# Patient Record
Sex: FEMALE | Race: BLACK OR AFRICAN AMERICAN | NOT HISPANIC OR LATINO | Employment: FULL TIME | URBAN - METROPOLITAN AREA
[De-identification: names, ages, dates, MRNs, and addresses within clinical notes are randomized per-mention and may not be internally consistent; named-entity substitution may affect disease eponyms.]

---

## 2018-07-20 ENCOUNTER — OFFICE VISIT (OUTPATIENT)
Dept: SPORTS MEDICINE | Facility: CLINIC | Age: 43
End: 2018-07-20
Payer: COMMERCIAL

## 2018-07-20 ENCOUNTER — HOSPITAL ENCOUNTER (OUTPATIENT)
Dept: RADIOLOGY | Facility: HOSPITAL | Age: 43
Discharge: HOME | End: 2018-07-20
Attending: PHYSICIAN ASSISTANT
Payer: COMMERCIAL

## 2018-07-20 VITALS
HEART RATE: 94 BPM | BODY MASS INDEX: 43.4 KG/M2 | WEIGHT: 293 LBS | DIASTOLIC BLOOD PRESSURE: 91 MMHG | SYSTOLIC BLOOD PRESSURE: 154 MMHG | HEIGHT: 69 IN

## 2018-07-20 DIAGNOSIS — M22.2X1 PATELLOFEMORAL PAIN SYNDROME OF RIGHT KNEE: Primary | ICD-10-CM

## 2018-07-20 DIAGNOSIS — M25.561 RIGHT KNEE PAIN, UNSPECIFIED CHRONICITY: ICD-10-CM

## 2018-07-20 PROCEDURE — 73564 X-RAY EXAM KNEE 4 OR MORE: CPT | Mod: 26,50,, | Performed by: RADIOLOGY

## 2018-07-20 PROCEDURE — 99999 PR PBB SHADOW E&M-EST. PATIENT-LVL III: CPT | Mod: PBBFAC,,, | Performed by: PHYSICIAN ASSISTANT

## 2018-07-20 PROCEDURE — 99204 OFFICE O/P NEW MOD 45 MIN: CPT | Mod: S$GLB,,, | Performed by: PHYSICIAN ASSISTANT

## 2018-07-20 PROCEDURE — 3008F BODY MASS INDEX DOCD: CPT | Mod: CPTII,S$GLB,, | Performed by: PHYSICIAN ASSISTANT

## 2018-07-20 PROCEDURE — 73564 X-RAY EXAM KNEE 4 OR MORE: CPT | Mod: TC,50,FY,PO

## 2018-07-20 RX ORDER — MELOXICAM 15 MG/1
15 TABLET ORAL DAILY
Qty: 30 TABLET | Refills: 2 | Status: SHIPPED | OUTPATIENT
Start: 2018-07-20 | End: 2018-07-20 | Stop reason: CLARIF

## 2018-07-20 RX ORDER — MELOXICAM 15 MG/1
15 TABLET ORAL DAILY
Qty: 30 TABLET | Refills: 2 | Status: SHIPPED | OUTPATIENT
Start: 2018-07-20 | End: 2019-06-20

## 2018-07-20 NOTE — PROGRESS NOTES
Subjective:          Chief Complaint: Belinda Schultz is a 43 y.o. female who had concerns including Pain of the Right Knee.    Belinda Schultz is a .The pain started 2 weeks ago with no clear JENNY and is becoming progressively worse. Pain is located over (points to) medial joint line. She reports that the pain is a 3 /10 aching and dull pain today and not responding adequately to conservative measures which have included activity modifications, rest, and oral medication (naproxen). Is affecting ADLs and limiting desired level of activity. Denies numbness, tingling, radiation, and inability to bear weight.  Pain is 7 /10 at its worst    Mechanical symptoms: none  Subjective instability: (--)   Worse with flexion  Better with rest.   Nocturnal symptoms: (+)    No previous surgeries or trauma on knee              Review of Systems   Constitution: Negative for chills and fever.   HENT: Negative for congestion and sore throat.    Eyes: Negative for discharge and double vision.   Cardiovascular: Negative for chest pain, palpitations and syncope.   Respiratory: Negative for cough and shortness of breath.    Endocrine: Negative for cold intolerance and heat intolerance.   Skin: Negative for dry skin and rash.   Musculoskeletal: Positive for joint pain and stiffness. Negative for falls, joint swelling, muscle cramps, muscle weakness, myalgias and neck pain.   Gastrointestinal: Negative for abdominal pain, nausea and vomiting.   Neurological: Negative for focal weakness, numbness and paresthesias.       Pain Related Questions  Over the past 3 days, what was your average pain during activity? (I.e. running, jogging, walking, climbing stairs, getting dressed, ect.): 4  Over the past 3 days, what was your highest pain level?: 7  Over the past 3 days, what was your lowest pain level? : 0    Other  Was the patient's HEIGHT measured or patient reported?: Patient Reported  Was the patient's WEIGHT measured or patient  reported?: Measured      Objective:        General: Belinda is well-developed, well-nourished, appears stated age, in no acute distress, alert and oriented to time, place and person.     General    Vitals reviewed.  Constitutional: She is oriented to person, place, and time. She appears well-developed and well-nourished. No distress.   Eyes: Conjunctivae and EOM are normal. Pupils are equal, round, and reactive to light.   Neck: Normal range of motion. Neck supple. No JVD present.   Cardiovascular: Normal heart sounds and intact distal pulses.    No murmur heard.  Pulmonary/Chest: Effort normal and breath sounds normal. No respiratory distress.   Abdominal: Soft. Bowel sounds are normal. She exhibits no distension. There is no tenderness.   Neurological: She is alert and oriented to person, place, and time. Coordination normal.   Psychiatric: She has a normal mood and affect. Her behavior is normal. Judgment and thought content normal.     General Musculoskeletal Exam   Gait: antalgic       Right Knee Exam     Inspection   Erythema: absent  Scars: absent  Swelling: absent  Effusion: effusion  Deformity: deformity  Bruising: absent    Tenderness   The patient is tender to palpation of the medial joint line, condyle and patella.    Crepitus   The patient has crepitus of the patella.    Range of Motion   Extension:  0 (pain) abnormal   Flexion:  110 (pain) abnormal     Tests   Meniscus   Christine:  Medial - positive Lateral - negative  Ligament Examination Lachman: normal (-1 to 2mm) PCL-Posterior Drawer: normal (0 to 2mm)     MCL - Valgus: normal (0 to 2mm)  LCL - Varus: normalDial Test at 90 degrees: normal (< 5 degrees)  Patella   Patellar Tracking: abnormal  Patellar Glide (quadrants): Lateral - 1   Medial - 2  Patellar Grind: positive    Other   Muscle Tightness: quadriceps tightness  Sensation: normal    Comments:  +TTP at medial joint line with terminal flexion and terminal extension      Left Knee Exam      Inspection   Erythema: absent  Scars: absent  Swelling: absent  Effusion: absent  Deformity: deformity  Bruising: absent    Range of Motion   Extension:  0 normal   Flexion:  110 normal     Tests   Meniscus   Christine:  Medial - negative Lateral - negative  Stability Lachman: normal (-1 to 2mm) PCL-Posterior Drawer: normal (0 to 2mm)  MCL - Valgus: normal (0 to 2mm)  LCL - Varus: normal (0 to 2mm)Dial Test at 90 degrees: normal (< 5 degrees)  Patella   Patellar Tracking: abnormal  Patellar Glide (Quadrants): Lateral - 1 Medial - 2  Patellar Grind: negative    Other   Muscle Tightness: quadriceps tightness  Sensation: normal    Right Hip Exam     Tests   Nazia: negative  Left Hip Exam     Tests   Nazia: negative          Muscle Strength   Right Lower Extremity   Hip Abduction: 4/5   Quadriceps:  4/5   Hamstrin/5   Left Lower Extremity   Hip Abduction: 5/5   Quadriceps:  5/5   Hamstrin/5     Vascular Exam     Right Pulses  Dorsalis Pedis:      2+  Posterior Tibial:      2+        Left Pulses  Dorsalis Pedis:      2+  Posterior Tibial:      2+        Edema  Right Lower Leg: absent  Left Lower Leg: absent    Radiographic Findings 2018:    There is a small amount of fluid in the right suprapatellar bursa and a small spur at the superior pole of the right patella.    On AP standing view there is slight loss of joint space in the medial tibial femoral compartments.  Otherwise joint spaces and cortical margins are smooth.    I detect no fracture, dislocation, radiopaque retained foreign body, lytic or blastic lesion, erosion or chondrocalcinosis.    Xrays of the knees were ordered and reviewed by me today. These findings were discussed and reviewed with the patient.            Assessment:       Encounter Diagnoses   Name Primary?    Patellofemoral pain syndrome of right knee Yes    Right knee pain, unspecified chronicity           Plan:       1. Mobic 15 mg 1 time daily PRN for pain management. Patient  understands to take with food and/or OTC prilosec to decrease GI side effects.  2. Ambulatory referral to physical therapy for patellofemoral strengthening and conditioning.  3. Ice compress to the affected area 2-3x a day for 15-20 minutes as needed for pain management.  4. RTC to see Cheikh Pleitez PA-C in 6 weeks for follow-up.      All of the patient's questions were answered and the patient will contact us if they have any questions or concerns in the interim.                        Patient questionnaires may have been collected.

## 2018-09-12 ENCOUNTER — OFFICE VISIT (OUTPATIENT)
Dept: SPORTS MEDICINE | Facility: CLINIC | Age: 43
End: 2018-09-12
Payer: COMMERCIAL

## 2018-09-12 VITALS
HEART RATE: 76 BPM | BODY MASS INDEX: 43.4 KG/M2 | SYSTOLIC BLOOD PRESSURE: 156 MMHG | DIASTOLIC BLOOD PRESSURE: 77 MMHG | WEIGHT: 293 LBS | HEIGHT: 69 IN

## 2018-09-12 DIAGNOSIS — M25.561 RIGHT KNEE PAIN, UNSPECIFIED CHRONICITY: ICD-10-CM

## 2018-09-12 DIAGNOSIS — M22.2X1 PATELLOFEMORAL PAIN SYNDROME OF RIGHT KNEE: Primary | ICD-10-CM

## 2018-09-12 DIAGNOSIS — M17.11 PRIMARY OSTEOARTHRITIS OF RIGHT KNEE: ICD-10-CM

## 2018-09-12 PROCEDURE — 99214 OFFICE O/P EST MOD 30 MIN: CPT | Mod: S$GLB,,, | Performed by: PHYSICIAN ASSISTANT

## 2018-09-12 PROCEDURE — 99999 PR PBB SHADOW E&M-EST. PATIENT-LVL III: CPT | Mod: PBBFAC,,, | Performed by: PHYSICIAN ASSISTANT

## 2018-09-12 PROCEDURE — 3008F BODY MASS INDEX DOCD: CPT | Mod: CPTII,S$GLB,, | Performed by: PHYSICIAN ASSISTANT

## 2018-09-12 RX ORDER — IBUPROFEN 200 MG
800 TABLET ORAL EVERY 6 HOURS PRN
COMMUNITY
End: 2019-06-20

## 2018-09-12 NOTE — PROGRESS NOTES
Subjective:          Chief Complaint: Belinda Schultz is a 43 y.o. female who had concerns including Pain of the Right Knee.    Belinda Schultz is a  presents for follow-up knee pain.    Interval hx: Pain has not improved since last visit. She admits to not doing physical therapy. Ibuprofen gives her more relief than Mobic.    The pain started 2 weeks ago with no clear JENNY and is becoming progressively worse. Pain is located over (points to) medial joint line. She reports that the pain is a 3 /10 aching and dull pain today and not responding adequately to conservative measures which have included activity modifications, rest, and oral medication (naproxen). Is affecting ADLs and limiting desired level of activity. Denies numbness, tingling, radiation, and inability to bear weight.  Pain is 7 /10 at its worst    Mechanical symptoms: none  Subjective instability: (--)   Worse with flexion  Better with rest.   Nocturnal symptoms: (+)    No previous surgeries or trauma on knee          Pain   Pertinent negatives include no abdominal pain, chest pain, chills, congestion, coughing, fever, joint swelling, myalgias, nausea, neck pain, numbness, rash, sore throat or vomiting.       Review of Systems   Constitution: Negative for chills and fever.   HENT: Negative for congestion and sore throat.    Eyes: Negative for discharge and double vision.   Cardiovascular: Negative for chest pain, palpitations and syncope.   Respiratory: Negative for cough and shortness of breath.    Endocrine: Negative for cold intolerance and heat intolerance.   Skin: Negative for dry skin and rash.   Musculoskeletal: Positive for joint pain and stiffness. Negative for falls, joint swelling, muscle cramps, muscle weakness, myalgias and neck pain.   Gastrointestinal: Negative for abdominal pain, nausea and vomiting.   Neurological: Negative for focal weakness, numbness and paresthesias.       Pain Related Questions  Over the past 3 days, what  was your average pain during activity? (I.e. running, jogging, walking, climbing stairs, getting dressed, ect.): 6  Over the past 3 days, what was your highest pain level?: 6  Over the past 3 days, what was your lowest pain level? : 3    Other  How many nights a week are you awakened by your affected body part?: 2  Was the patient's HEIGHT measured or patient reported?: Patient Reported  Was the patient's WEIGHT measured or patient reported?: Measured      Objective:        General: Belinda is well-developed, well-nourished, appears stated age, in no acute distress, alert and oriented to time, place and person.     General    Vitals reviewed.  Constitutional: She is oriented to person, place, and time. She appears well-developed and well-nourished. No distress.   Eyes: Conjunctivae and EOM are normal. Pupils are equal, round, and reactive to light.   Neck: Normal range of motion. Neck supple. No JVD present.   Cardiovascular: Normal heart sounds and intact distal pulses.    No murmur heard.  Pulmonary/Chest: Effort normal and breath sounds normal. No respiratory distress.   Abdominal: Soft. Bowel sounds are normal. She exhibits no distension. There is no tenderness.   Neurological: She is alert and oriented to person, place, and time. Coordination normal.   Psychiatric: She has a normal mood and affect. Her behavior is normal. Judgment and thought content normal.     General Musculoskeletal Exam   Gait: antalgic       Right Knee Exam     Inspection   Erythema: absent  Scars: absent  Swelling: absent  Effusion: effusion  Deformity: deformity  Bruising: absent    Tenderness   The patient is tender to palpation of the medial joint line, condyle and patella.    Crepitus   The patient has crepitus of the patella.    Range of Motion   Extension:  0 (pain) abnormal   Flexion:  110 (pain) abnormal     Tests   Meniscus   Christine:  Medial - positive Lateral - negative  Ligament Examination Lachman: normal (-1 to 2mm)  PCL-Posterior Drawer: normal (0 to 2mm)     MCL - Valgus: normal (0 to 2mm)  LCL - Varus: normalDial Test at 90 degrees: normal (< 5 degrees)  Patella   Patellar Tracking: abnormal  Patellar Glide (quadrants): Lateral - 1   Medial - 2  Patellar Grind: positive    Other   Muscle Tightness: quadriceps tightness  Sensation: normal    Comments:  +TTP at medial joint line with terminal flexion and terminal extension      Left Knee Exam     Inspection   Erythema: absent  Scars: absent  Swelling: absent  Effusion: absent  Deformity: deformity  Bruising: absent    Range of Motion   Extension:  0 normal   Flexion:  110 normal     Tests   Meniscus   Christine:  Medial - negative Lateral - negative  Stability Lachman: normal (-1 to 2mm) PCL-Posterior Drawer: normal (0 to 2mm)  MCL - Valgus: normal (0 to 2mm)  LCL - Varus: normal (0 to 2mm)Dial Test at 90 degrees: normal (< 5 degrees)  Patella   Patellar Tracking: abnormal  Patellar Glide (Quadrants): Lateral - 1 Medial - 2  Patellar Grind: negative    Other   Muscle Tightness: quadriceps tightness  Sensation: normal    Right Hip Exam     Tests   Nazia: negative  Left Hip Exam     Tests   Nazia: negative          Muscle Strength   Right Lower Extremity   Hip Abduction: 4/5   Quadriceps:  4/5   Hamstrin/5   Left Lower Extremity   Hip Abduction: 5/5   Quadriceps:  5/5   Hamstrin/5     Vascular Exam     Right Pulses  Dorsalis Pedis:      2+  Posterior Tibial:      2+        Left Pulses  Dorsalis Pedis:      2+  Posterior Tibial:      2+        Edema  Right Lower Leg: absent  Left Lower Leg: absent    Radiographic Findings 2018:    There is a small amount of fluid in the right suprapatellar bursa and a small spur at the superior pole of the right patella.    On AP standing view there is slight loss of joint space in the medial tibial femoral compartments.  Otherwise joint spaces and cortical margins are smooth.    I detect no fracture, dislocation, radiopaque retained  foreign body, lytic or blastic lesion, erosion or chondrocalcinosis.    Xrays of the knees were ordered and reviewed by me today. These findings were discussed and reviewed with the patient.            Assessment:       Encounter Diagnoses   Name Primary?    Patellofemoral pain syndrome of right knee Yes    Right knee pain, unspecified chronicity           Plan:       1. Pre-authorization placed for Synvisc series injections.  2. Ice compress to the affected area 2-3x a day for 15-20 minutes as needed for pain management.  3. Mobic 15 mg 1 time daily PRN for pain management. Patient understands to take with food and/or OTC prilosec to decrease GI side effects.  4. New PT referral placed today.  5. Patient understands that increased BMI can contribute to chronic knee pain. Currently Body mass index is 48.44 kg/m². . She was given a BMI goal of 45 today.  6. RTC to see Cheikh Pleitez PA-C for Synvisc series injections #1 of 3.      All of the patient's questions were answered and the patient will contact us if they have any questions or concerns in the interim.                        Patient questionnaires may have been collected.

## 2018-09-24 ENCOUNTER — CLINICAL SUPPORT (OUTPATIENT)
Dept: REHABILITATION | Facility: HOSPITAL | Age: 43
End: 2018-09-24
Payer: COMMERCIAL

## 2018-09-24 DIAGNOSIS — M25.561 CHRONIC PAIN OF RIGHT KNEE: ICD-10-CM

## 2018-09-24 DIAGNOSIS — G89.29 CHRONIC PAIN OF RIGHT KNEE: ICD-10-CM

## 2018-09-24 PROCEDURE — 97161 PT EVAL LOW COMPLEX 20 MIN: CPT | Performed by: PHYSICAL THERAPIST

## 2018-09-24 NOTE — PATIENT INSTRUCTIONS
ROM: Heel Prop    Lie with pillow under right heel. Tighten the muscles on the top of leg while trying to push knee toward the floor. Hold 1-5 minutes for a cumulative total of 30 minutes per day.       [email or web address]       Strengthening: Quadriceps Set    Tighten muscles on top of thighs by pushing knees down into surface. Hold 5 seconds.  Repeat 25 times . R/L or BL  leg per set. Do 1 sets per session. Do 2 sessions per day.        Straight Leg Raise     With R/L or BL leg straight, other leg bent, raise straight leg until knees are even. Slowly lower. Roll on your side and repeat lifting top leg up, on other side, lifting bottom leg up, and on stomach kicking back (squeezing your rear end before you kick back).  Repeat 25 times. Do 1 sets per session. Do 2 sessions per day.       Glute Squeeze, supine    Lie face up and squeeze your rear end. Do not tighten your abdominals or hold your breath. Squeeze our glutes and hold 5 seconds. Relax.  Repeat 25 times per set. Do 1 sets per session. Do 2 sessions per day.      Strengthening: Hip Adduction - Isometric    Sit back or lie down with ball or folded pillow between knees, and squeeze knees together. Hold 5 seconds.  Repeat 25 times per set. Do 1 sets per session. Do 2 sessions per day.                    Knee Extension: Terminal - Standing    Face anchor in staggered shoulder width stance, band around front knee. Allow tension of band to slightly bend knee. Pull knee back by straightening knee.  Repeat 25 times per set. Repeat with other leg. Do 1 sets per session. Do 2 sessions per day.     Copyright © [employer]. All rights reserved.      Hamstring Stretch    Perform 5 times, 30 sec hold 2x day - (HEP to go)      Hamstring Curl        Perform 25 times, 2x per day. (HEP to GO)

## 2018-10-02 ENCOUNTER — OFFICE VISIT (OUTPATIENT)
Dept: SPORTS MEDICINE | Facility: CLINIC | Age: 43
End: 2018-10-02
Payer: COMMERCIAL

## 2018-10-02 VITALS
WEIGHT: 293 LBS | BODY MASS INDEX: 43.4 KG/M2 | HEIGHT: 69 IN | DIASTOLIC BLOOD PRESSURE: 84 MMHG | SYSTOLIC BLOOD PRESSURE: 125 MMHG | HEART RATE: 74 BPM

## 2018-10-02 DIAGNOSIS — M17.11 PRIMARY OSTEOARTHRITIS OF RIGHT KNEE: Primary | ICD-10-CM

## 2018-10-02 DIAGNOSIS — M22.2X1 PATELLOFEMORAL PAIN SYNDROME OF RIGHT KNEE: ICD-10-CM

## 2018-10-02 PROCEDURE — 99999 PR PBB SHADOW E&M-EST. PATIENT-LVL III: CPT | Mod: PBBFAC,,, | Performed by: PHYSICIAN ASSISTANT

## 2018-10-02 PROCEDURE — 99499 UNLISTED E&M SERVICE: CPT | Mod: S$GLB,,, | Performed by: PHYSICIAN ASSISTANT

## 2018-10-02 PROCEDURE — 20610 DRAIN/INJ JOINT/BURSA W/O US: CPT | Mod: RT,S$GLB,, | Performed by: PHYSICIAN ASSISTANT

## 2018-10-02 NOTE — PROGRESS NOTES
Belinda Schultz is here for follow up of right knee arthritis. Pt is requesting Synvisc series injections #1 of 3.  [facility] reviewed per encounter record. Has failed other conservative modalities including NSAIDS, activity modification, weight loss.    The prior shot was tolerated well.    PHYSICAL EXAMINATION:     General: The patient is alert and oriented x 3. Mood is pleasant.   Observation of ears, eyes and nose reveals no gross abnormalities. No   labored breathing observed.     No signs of infection or adverse reaction to knee.    Injection Procedure  A time out was performed, including verification of patient ID, procedure, site and side, availability of information and equipment, review of safety issues, and agreement with consent, the procedure site was marked.    After time out was performed, the patient was prepped aseptically with chloraprep. A diagnostic and therapeutic injection of 2cc Synvisc was given under sterile technique using a 22g x 1.5 needle from the anterior medial aspect of the right Knee Joint in the supine position with knee bent.      Belinda Schultz had no adverse reactions to the medication. Pain decreased. She was instructed to apply ice to the joint for 20 minutes and avoid strenuous activities for 24-36 hours following the injection. She was warned of possible blood sugar and/or blood pressure changes during that time. Following that time, she can resume regular activities.    She was reminded to call the clinic immediately for any adverse side effects as explained in clinic today.    RTC to see Cheikh Pleietz PA-C for Synvisc series injections #2 of 3.    All of the patient's questions were answered and the patient will contact us if they have any questions or concerns in the interim.

## 2018-10-09 ENCOUNTER — OFFICE VISIT (OUTPATIENT)
Dept: SPORTS MEDICINE | Facility: CLINIC | Age: 43
End: 2018-10-09
Payer: COMMERCIAL

## 2018-10-09 VITALS
BODY MASS INDEX: 43.4 KG/M2 | SYSTOLIC BLOOD PRESSURE: 135 MMHG | HEART RATE: 69 BPM | HEIGHT: 69 IN | WEIGHT: 293 LBS | DIASTOLIC BLOOD PRESSURE: 77 MMHG

## 2018-10-09 DIAGNOSIS — M17.11 PRIMARY OSTEOARTHRITIS OF RIGHT KNEE: Primary | ICD-10-CM

## 2018-10-09 PROCEDURE — 20610 DRAIN/INJ JOINT/BURSA W/O US: CPT | Mod: RT,S$GLB,, | Performed by: PHYSICIAN ASSISTANT

## 2018-10-09 PROCEDURE — 99499 UNLISTED E&M SERVICE: CPT | Mod: S$GLB,,, | Performed by: PHYSICIAN ASSISTANT

## 2018-10-09 PROCEDURE — 99999 PR PBB SHADOW E&M-EST. PATIENT-LVL III: CPT | Mod: PBBFAC,,, | Performed by: PHYSICIAN ASSISTANT

## 2018-10-09 NOTE — PROGRESS NOTES
Belinda Schultz is here for follow up of right knee arthritis. Pt is requesting Synvisc series injections #2 of 3.  [facility] reviewed per encounter record. Has failed other conservative modalities including NSAIDS, activity modification, weight loss.    The prior shot was tolerated well.    PHYSICAL EXAMINATION:     General: The patient is alert and oriented x 3. Mood is pleasant.   Observation of ears, eyes and nose reveals no gross abnormalities. No   labored breathing observed.     No signs of infection or adverse reaction to knee.    Injection Procedure  A time out was performed, including verification of patient ID, procedure, site and side, availability of information and equipment, review of safety issues, and agreement with consent, the procedure site was marked.    After time out was performed, the patient was prepped aseptically with chloraprep. A diagnostic and therapeutic injection of 2cc Synvisc was given under sterile technique using a 22g x 1.5 needle from the anterior medial aspect of the right Knee Joint in the supine position with knee bent.      Belinda Schultz had no adverse reactions to the medication. Pain decreased. She was instructed to apply ice to the joint for 20 minutes and avoid strenuous activities for 24-36 hours following the injection. She was warned of possible blood sugar and/or blood pressure changes during that time. Following that time, she can resume regular activities.    She was reminded to call the clinic immediately for any adverse side effects as explained in clinic today.    RTC to see Cheikh Pleitez PA-C for Synvisc series injections #3 of 3.    All of the patient's questions were answered and the patient will contact us if they have any questions or concerns in the interim.

## 2018-10-16 ENCOUNTER — OFFICE VISIT (OUTPATIENT)
Dept: SPORTS MEDICINE | Facility: CLINIC | Age: 43
End: 2018-10-16
Payer: COMMERCIAL

## 2018-10-16 VITALS — HEIGHT: 69 IN | BODY MASS INDEX: 43.4 KG/M2 | WEIGHT: 293 LBS

## 2018-10-16 DIAGNOSIS — M17.11 PRIMARY OSTEOARTHRITIS OF RIGHT KNEE: Primary | ICD-10-CM

## 2018-10-16 PROCEDURE — 20610 DRAIN/INJ JOINT/BURSA W/O US: CPT | Mod: RT,S$GLB,, | Performed by: PHYSICIAN ASSISTANT

## 2018-10-16 PROCEDURE — 99499 UNLISTED E&M SERVICE: CPT | Mod: S$GLB,,, | Performed by: PHYSICIAN ASSISTANT

## 2018-10-16 PROCEDURE — 99999 PR PBB SHADOW E&M-EST. PATIENT-LVL III: CPT | Mod: PBBFAC,,, | Performed by: PHYSICIAN ASSISTANT

## 2018-10-16 NOTE — PROGRESS NOTES
Belinda Schultz is here for follow up of right knee arthritis. Pt is requesting Synvisc series injections #3 of 3.  [facility] Has failed other conservative modalities including NSAIDS, activity modification, weight loss.    The prior shot was tolerated well.    PHYSICAL EXAMINATION:     General: The patient is alert and oriented x 3. Mood is pleasant.   Observation of ears, eyes and nose reveals no gross abnormalities. No   labored breathing observed.     No signs of infection or adverse reaction to knee.    Injection Procedure  A time out was performed, including verification of patient ID, procedure, site and side, availability of information and equipment, review of safety issues, and agreement with consent, the procedure site was marked.    After time out was performed, the patient was prepped aseptically with chloraprep. A diagnostic and therapeutic injection of 2cc Synvisc was given under sterile technique using a 22g x 1.5 needle from the anterior medial aspect of the right Knee Joint in the supine position with knee bent.      Belinda Schultz had no adverse reactions to the medication. Pain decreased. She was instructed to apply ice to the joint for 20 minutes and avoid strenuous activities for 24-36 hours following the injection. She was warned of possible blood sugar and/or blood pressure changes during that time. Following that time, she can resume regular activities.    She was reminded to call the clinic immediately for any adverse side effects as explained in clinic today.    RTC to see Cheikh Pleitez PA-C in 8 week for follow-up.    All of the patient's questions were answered and the patient will contact us if they have any questions or concerns in the interim.

## 2019-06-20 ENCOUNTER — HOSPITAL ENCOUNTER (OUTPATIENT)
Dept: RADIOLOGY | Facility: HOSPITAL | Age: 44
Discharge: HOME | End: 2019-06-20
Attending: ORTHOPAEDIC SURGERY
Payer: COMMERCIAL

## 2019-06-20 ENCOUNTER — OFFICE VISIT (OUTPATIENT)
Dept: ORTHOPEDICS | Facility: CLINIC | Age: 44
End: 2019-06-20
Payer: COMMERCIAL

## 2019-06-20 VITALS — HEIGHT: 69 IN | WEIGHT: 293 LBS | BODY MASS INDEX: 43.4 KG/M2

## 2019-06-20 DIAGNOSIS — M25.511 ARTHRALGIA OF RIGHT ACROMIOCLAVICULAR JOINT: ICD-10-CM

## 2019-06-20 DIAGNOSIS — M25.511 CHRONIC RIGHT SHOULDER PAIN: Primary | ICD-10-CM

## 2019-06-20 DIAGNOSIS — G89.29 CHRONIC RIGHT SHOULDER PAIN: Primary | ICD-10-CM

## 2019-06-20 DIAGNOSIS — M25.511 CHRONIC RIGHT SHOULDER PAIN: ICD-10-CM

## 2019-06-20 DIAGNOSIS — G89.29 CHRONIC RIGHT SHOULDER PAIN: ICD-10-CM

## 2019-06-20 DIAGNOSIS — M75.51 BURSITIS OF RIGHT SHOULDER: ICD-10-CM

## 2019-06-20 PROCEDURE — 3008F PR BODY MASS INDEX (BMI) DOCUMENTED: ICD-10-PCS | Mod: CPTII,S$GLB,, | Performed by: ORTHOPAEDIC SURGERY

## 2019-06-20 PROCEDURE — 99999 PR PBB SHADOW E&M-EST. PATIENT-LVL III: CPT | Mod: PBBFAC,,, | Performed by: ORTHOPAEDIC SURGERY

## 2019-06-20 PROCEDURE — 3008F BODY MASS INDEX DOCD: CPT | Mod: CPTII,S$GLB,, | Performed by: ORTHOPAEDIC SURGERY

## 2019-06-20 PROCEDURE — 99999 PR PBB SHADOW E&M-EST. PATIENT-LVL III: ICD-10-PCS | Mod: PBBFAC,,, | Performed by: ORTHOPAEDIC SURGERY

## 2019-06-20 PROCEDURE — 99203 PR OFFICE/OUTPT VISIT, NEW, LEVL III, 30-44 MIN: ICD-10-PCS | Mod: 25,S$GLB,, | Performed by: ORTHOPAEDIC SURGERY

## 2019-06-20 PROCEDURE — 73030 XR SHOULDER COMPLETE 2 OR MORE VIEWS RIGHT: ICD-10-PCS | Mod: 26,RT,, | Performed by: RADIOLOGY

## 2019-06-20 PROCEDURE — 73030 X-RAY EXAM OF SHOULDER: CPT | Mod: 26,RT,, | Performed by: RADIOLOGY

## 2019-06-20 PROCEDURE — 99203 OFFICE O/P NEW LOW 30 MIN: CPT | Mod: 25,S$GLB,, | Performed by: ORTHOPAEDIC SURGERY

## 2019-06-20 PROCEDURE — 73030 X-RAY EXAM OF SHOULDER: CPT | Mod: TC,PN,RT

## 2019-06-20 PROCEDURE — 20610 DRAIN/INJ JOINT/BURSA W/O US: CPT | Mod: RT,S$GLB,, | Performed by: ORTHOPAEDIC SURGERY

## 2019-06-20 PROCEDURE — 20610 PR DRAIN/INJECT LARGE JOINT/BURSA: ICD-10-PCS | Mod: RT,S$GLB,, | Performed by: ORTHOPAEDIC SURGERY

## 2019-06-20 RX ORDER — TIZANIDINE 4 MG/1
TABLET ORAL
Refills: 0 | COMMUNITY
Start: 2019-06-17 | End: 2019-06-20

## 2019-06-20 RX ORDER — TRIAMCINOLONE ACETONIDE 40 MG/ML
40 INJECTION, SUSPENSION INTRA-ARTICULAR; INTRAMUSCULAR
Status: COMPLETED | OUTPATIENT
Start: 2019-06-20 | End: 2019-06-20

## 2019-06-20 RX ADMIN — TRIAMCINOLONE ACETONIDE 40 MG: 40 INJECTION, SUSPENSION INTRA-ARTICULAR; INTRAMUSCULAR at 11:06

## 2019-06-20 NOTE — PROGRESS NOTES
"Subjective:      Patient ID: Belinda Schultz is a 44 y.o. female.    Chief Complaint: Shoulder Pain (right )    HPI: Belinda Schultz here for initial visit and evaluation with complaints of right shoulder pain for 3 months duration.  She denies any relevant history of injury or aggravation.  However she did get a massage about 3 months ago and the pain started shortly after. She describes a constant mild dull pain that intermittently becomes more bothersome.  Over the last 3 weeks it has worsened and has caused sleep disturbance.  Pain increases with reaching across her body but not with forward flexion.  She denies any numbness and tingling.  She saw her primary care provider last week who treated her with a muscle relaxer.  Patient reports muscle relaxer made her sleepy but did not improve pain.  She is only taking this once and has discontinued.    History reviewed. No pertinent past medical history.  No current outpatient medications on file.  No current facility-administered medications for this visit.   Review of patient's allergies indicates:  No Known Allergies    Ht 5' 9" (1.753 m)   Wt (!) 148.8 kg (328 lb)   BMI 48.44 kg/m²     Review of Systems   Constitution: Negative for chills and fever.   Cardiovascular: Negative for chest pain and palpitations.   Respiratory: Negative for shortness of breath and wheezing.    Skin: Negative for poor wound healing and rash.   Musculoskeletal: Positive for joint pain. Negative for joint swelling, muscle weakness and stiffness.   Gastrointestinal: Negative for nausea and vomiting.   Genitourinary: Negative for dysuria and hematuria.   Neurological: Negative for numbness, paresthesias, seizures and tremors.   Psychiatric/Behavioral: Negative for altered mental status.   Allergic/Immunologic: Negative for environmental allergies and persistent infections.         Objective:    Ortho Exam       Right shoulder  Skin: No rashes or lesions on exposed areas.  Atrophy: none " noted.  Tenderness to palpation: AC Joint and and lateral.  AROM (deg): abduction-110, flexion-180, rotation- unrestricted, painful rotation- absent.  Rotator cuff: normal, Impingement test- positive.  Cross arm adduction test- positive.  Instability testing: negative.   Distal neuro: normal, no muscle wasting or atrophy.  Pulses: Positive peripheral pulses.   GEN: Well developed, well nourished female. AAOX3. No acute distress.   Normocephalic, atraumatic.   KAELA  Breathing unlabored.  Mood and affect appropriate.       Assessment:     Imaging:  Shoulder radiographs from today reveal no significant abnormalities.  AC joint arthrosis noted.        1. Chronic right shoulder pain    2. Bursitis of right shoulder    3. Arthralgia of right acromioclavicular joint          Plan:       Explained the nature of problem the patient regards to AC joint arthrosis versus tendinitis/bursitis.  For both, I recommended and performed corticosteroid injection of the right shoulder.   Avoid provocative activities.   Continue anti-inflammatory -- patient like to use over-the-counter anti-inflammatories this is reasonable.    Orders Placed This Encounter    X-ray Shoulder 2 or More Views Right    triamcinolone acetonide injection 40 mg     Follow up in about 6 weeks (around 8/1/2019).

## 2019-06-20 NOTE — LETTER
June 20, 2019      Jamie Caban MD  [address]  [address]  [facility] [address]           [facility]  [address]  [facility] [address]  Phone: [phone]          Patient: Belinda Schultz   MR Number: [id number]   YOB: 1975   Date of Visit: 6/20/2019       Dear Dr. Jamie Caban:    Thank you for referring Belinda Schultz to me for evaluation. Attached you will find relevant portions of my assessment and plan of care.    If you have questions, please do not hesitate to call me. I look forward to following Belinda Schultz along with you.    Sincerely,    Judy Gaston PA-C    Enclosure  CC:  No Recipients    If you would like to receive this communication electronically, please contact [email or web address] or [phone] to request more information on [email or web address] Link access.    For providers and/or their staff who would like to refer a patient to [facility], please contact us through our one-stop-shop provider referral line, [facility] [place], at [phone].    If you feel you have received this communication in error or would no longer like to receive these types of communications, please e-mail [email or web address]

## 2020-01-30 ENCOUNTER — OFFICE VISIT (OUTPATIENT)
Dept: UROGYNECOLOGY | Facility: CLINIC | Age: 45
End: 2020-01-30
Payer: COMMERCIAL

## 2020-01-30 VITALS — BODY MASS INDEX: 43.4 KG/M2 | HEIGHT: 69 IN | WEIGHT: 293 LBS

## 2020-01-30 DIAGNOSIS — N94.810 VESTIBULITIS, VULVAR: Primary | ICD-10-CM

## 2020-01-30 PROCEDURE — 99203 OFFICE O/P NEW LOW 30 MIN: CPT | Mod: S$GLB,,, | Performed by: OBSTETRICS & GYNECOLOGY

## 2020-01-30 PROCEDURE — 3008F BODY MASS INDEX DOCD: CPT | Mod: CPTII,S$GLB,, | Performed by: OBSTETRICS & GYNECOLOGY

## 2020-01-30 PROCEDURE — 3008F PR BODY MASS INDEX (BMI) DOCUMENTED: ICD-10-PCS | Mod: CPTII,S$GLB,, | Performed by: OBSTETRICS & GYNECOLOGY

## 2020-01-30 PROCEDURE — 99999 PR PBB SHADOW E&M-EST. PATIENT-LVL II: CPT | Mod: PBBFAC,,, | Performed by: OBSTETRICS & GYNECOLOGY

## 2020-01-30 PROCEDURE — 99999 PR PBB SHADOW E&M-EST. PATIENT-LVL II: ICD-10-PCS | Mod: PBBFAC,,, | Performed by: OBSTETRICS & GYNECOLOGY

## 2020-01-30 PROCEDURE — 99203 PR OFFICE/OUTPT VISIT, NEW, LEVL III, 30-44 MIN: ICD-10-PCS | Mod: S$GLB,,, | Performed by: OBSTETRICS & GYNECOLOGY

## 2020-01-30 NOTE — PROGRESS NOTES
Under use the either side of there is last will week  Subjective:      Patient ID: Belinda Schultz is a 44 y.o. female.    Chief Complaint:  Other (scar tissue graulation )       History of Present Illness  Patient is well known for me.  I had seen her extensively at [facility] where of trying to assist with vaginal pain.  This pain I thought was emanating from 1 specific spot secondary scar vacation from prior surgical manipulation.  I do recall trying multiple compound creams to no success in fact patient had returned to [facility] for further follow-up but I had no longer been there she was referred to another provider who in forward with cautery and silver nitrate and has made the situation worse    This point is so tender that is precluding intercourse leading to inter personal relationship difficulties with her           No past medical history on file.    Past Surgical History:   Procedure Laterality Date     SECTION, CLASSIC      HYSTERECTOMY         GYN & OB History  No LMP recorded. Patient has had a hysterectomy.   Date of Last Pap: 4/3/2014    OB History    Para Term  AB Living   2 2 2     2   SAB TAB Ectopic Multiple Live Births           2      # Outcome Date GA Lbr Kang/2nd Weight Sex Delivery Anes PTL Lv   2 Term  40w0d   F CS-LTranv   KAREN   1 Term  40w0d   F CS-LTranv   KAREN       Health Maintenance       Date Due Completion Date    Lipid Panel 1976 ---    HIV Screening 1991 ---    TETANUS VACCINE 1994 ---    Mammogram 2016 ---    Influenza Vaccine (1) 2019 ---          Family History   Problem Relation Age of Onset    Cancer Mother        Social History     Socioeconomic History    Marital status:      Spouse name: Not on file    Number of children: Not on file    Years of education: Not on file    Highest education level: Not on file   Occupational History    Not on file   Social  "Needs    Financial resource strain: Not on file    Food insecurity:     Worry: Not on file     Inability: Not on file    Transportation needs:     Medical: Not on file     Non-medical: Not on file   Tobacco Use    Smoking status: Never Smoker    Smokeless tobacco: Never Used   Substance and Sexual Activity    Alcohol use: Yes     Comment: 1 d rink yesterday    Drug use: No    Sexual activity: Yes     Partners: Male     Birth control/protection: None   Lifestyle    Physical activity:     Days per week: Not on file     Minutes per session: Not on file    Stress: Not on file   Relationships    Social connections:     Talks on phone: Not on file     Gets together: Not on file     Attends [employer] service: Not on file     Active member of club or organization: Not on file     Attends meetings of clubs or organizations: Not on file     Relationship status: Not on file   Other Topics Concern    Not on file   Social History Narrative    Not on file       Review of Systems  Review of Systems   Genitourinary: Positive for dyspareunia.          Objective:   Ht 5' 9" (1.753 m)   Wt (!) 152 kg (335 lb 1.6 oz)   BMI 49.49 kg/m²     Physical Exam   Genitourinary:         Genitourinary Comments: Pinpoint tenderness to the posterior fourchette        Assessment:     1. Vestibulitis, vulvar            Plan:     1. Vestibulitis, vulvar        After examination had patient presented my office we reviewed my findings and still the same area that is tender from my recollection I took out 1 of my text books and showed her the procedure to remove that area of discomfort is not a technically challenging operation however I do believe that that area can lead to further scar vacation worsening the situation especially after have Passer to  cautery application in the recent past.  So at this point I am going to do is left patient reflect on this additionally I have given her a Uro jet 2% lidocaine which will apply to the " area a g before intercourse and we will see if this will improve if this is the case will continue in that direction at additionally out information release has been CIS of sign by the patient so that we can try to get history of all the creams that were utilized additionally there is the possibility that we can consider Botox injection into the area of tenderness.  All of this once again as stated explained to the patient.  Total time of this visit 25 min of time greater than 50% time 15 in direct discussion the reviewing options      There are no Patient Instructions on file for this visit.

## 2020-03-26 ENCOUNTER — OFFICE VISIT (OUTPATIENT)
Dept: URGENT CARE | Facility: CLINIC | Age: 45
End: 2020-03-26
Payer: COMMERCIAL

## 2020-03-26 VITALS
WEIGHT: 293 LBS | DIASTOLIC BLOOD PRESSURE: 79 MMHG | OXYGEN SATURATION: 98 % | HEART RATE: 84 BPM | SYSTOLIC BLOOD PRESSURE: 138 MMHG | TEMPERATURE: 99 F | HEIGHT: 69 IN | BODY MASS INDEX: 43.4 KG/M2

## 2020-03-26 DIAGNOSIS — J45.901 BRONCHITIS, ALLERGIC, UNSPECIFIED ASTHMA SEVERITY, WITH ACUTE EXACERBATION: Primary | ICD-10-CM

## 2020-03-26 LAB
CTP QC/QA: YES
FLUAV AG NPH QL: NEGATIVE
FLUBV AG NPH QL: NEGATIVE

## 2020-03-26 PROCEDURE — 99213 PR OFFICE/OUTPT VISIT, EST, LEVL III, 20-29 MIN: ICD-10-PCS | Mod: 25,S$GLB,, | Performed by: STUDENT IN AN ORGANIZED HEALTH CARE EDUCATION/TRAINING PROGRAM

## 2020-03-26 PROCEDURE — 99213 OFFICE O/P EST LOW 20 MIN: CPT | Mod: 25,S$GLB,, | Performed by: STUDENT IN AN ORGANIZED HEALTH CARE EDUCATION/TRAINING PROGRAM

## 2020-03-26 PROCEDURE — 87804 INFLUENZA ASSAY W/OPTIC: CPT | Mod: QW,S$GLB,, | Performed by: STUDENT IN AN ORGANIZED HEALTH CARE EDUCATION/TRAINING PROGRAM

## 2020-03-26 PROCEDURE — 87804 POCT INFLUENZA A/B: ICD-10-PCS | Mod: 59,QW,S$GLB, | Performed by: STUDENT IN AN ORGANIZED HEALTH CARE EDUCATION/TRAINING PROGRAM

## 2020-03-26 RX ORDER — BENZONATATE 100 MG/1
100-200 CAPSULE ORAL 3 TIMES DAILY PRN
Qty: 20 CAPSULE | Refills: 0 | Status: SHIPPED | OUTPATIENT
Start: 2020-03-26 | End: 2020-04-05

## 2020-03-26 RX ORDER — FLUTICASONE PROPIONATE 50 MCG
1 SPRAY, SUSPENSION (ML) NASAL 2 TIMES DAILY
Qty: 9.9 ML | Refills: 0 | Status: SHIPPED | OUTPATIENT
Start: 2020-03-26 | End: 2020-04-09

## 2020-03-26 NOTE — PATIENT INSTRUCTIONS
Bronchitis, Viral (Adult)    You have a viral bronchitis. Bronchitis is inflammation and swelling of the lining of the lungs. This is often caused by an infection. Symptoms include a dry, hacking cough that is worse at night. The cough may bring up yellow-green mucus. You may also feel short of breath or wheeze. Other symptoms may include tiredness, chest discomfort, and chills.  Bronchitis that is caused by a virus is not treated with antibiotics. Instead, medicines may be given to help relieve symptoms. Symptoms can last up to 2 weeks, although the cough may last much longer.  This illness is contagious during the first few days and is spread through the air by coughing and sneezing, or by direct contact (touching the sick person and then touching your own eyes, nose, or mouth).  Most viral illnesses resolve within 10 to 14 days with rest and simple home remedies, although they may sometimes last for several weeks.  Home care  · If symptoms are severe, rest at home for the first 2 to 3 days. When you go back to your usual activities, don't let yourself get too tired.  · Do not smoke. Also avoid being exposed to secondhand smoke.  · You may use over-the-counter medicine to control fever or pain, unless another pain medicine was prescribed. (Note: If you have chronic liver or kidney disease or have ever had a stomach ulcer or gastrointestinal bleeding, talk with your healthcare provider before using these medicines. Also talk to your provider if you are taking medicine to prevent blood clots.) Aspirin should never be given to anyone younger than 18 years of age who is ill with a viral infection or fever. It may cause severe liver or brain damage.  · Your appetite may be poor, so a light diet is fine. Avoid dehydration by drinking 6 to 8 glasses of fluids per day (such as water, soft drinks, sports drinks, juices, tea, or soup). Extra fluids will help loosen secretions in the nose and lungs.  · Over-the-counter  cough, cold, and sore-throat medicines will not shorten the length of the illness, but they may help to reduce symptoms. (Note: Do not use decongestants if you have high blood pressure.)  Follow-up care  Follow up with your healthcare provider, or as advised. If you had an X-ray or ECG (electrocardiogram), a specialist will review it. You will be notified of any new findings that may affect your care.  Note: If you are age 65 or older, or if you have a chronic lung disease or condition that affects your immune system, or you smoke, talk to your healthcare provider about having pneumococcal vaccinations and a yearly influenza vaccination (flu shot).  When to seek medical advice  Call your healthcare provider right away if any of these occur:  · Fever of 100.4°F (38°C) or higher  · Coughing up increased amounts of colored sputum  · Weakness, drowsiness, headache, facial pain, ear pain, or a stiff neck  Call 911, or get immediate medical care  Contact emergency services right away if any of these occur:  · Coughing up blood  · Worsening weakness, drowsiness, headache, or stiff neck  · Trouble breathing, wheezing, or pain with breathing  Date Last Reviewed: 9/13/2015  © 9531-8218 [employer]. [address] [place] [address], [place] [address]. All rights reserved. This information is not intended as a substitute for professional medical care. Always follow your healthcare professional's instructions.

## 2020-03-26 NOTE — PROGRESS NOTES
"Subjective:       Patient ID: Belinda Schultz is a 44 y.o. female.    Vitals:  height is 5' 9" (1.753 m) and weight is 152 kg (335 lb) (abnormal). Her temperature is 98.7 °F (37.1 °C). Her blood pressure is 138/79 and her pulse is 84. Her oxygen saturation is 98%.     Chief Complaint: URI    Pt presents for cough for ~2wks. Reports went to  on 03/06 for sinusitis, completed IM steroid and z-stefania with improvement of sinus symptoms, but developed cough ~1wk later. Sometimes productive, sometimes not. Has tried benzonatate and codeine cough syrup with some improvement but has persisted. Also using home nebulizer PRN as she has a history of frequent bronchitis. Denies f/c, GI sx's, CP, SoB, or wheezing. Has some chest tightness with coughing spells otherwise breathing well.    Cough   This is a new problem. The current episode started 1 to 4 weeks ago. The problem has been unchanged. The problem occurs every few minutes. The cough is productive of sputum. Associated symptoms include nasal congestion. Pertinent negatives include no chest pain, chills, ear congestion, ear pain, eye redness, fever, headaches, hemoptysis, myalgias, rash, rhinorrhea, sore throat, shortness of breath or wheezing. The symptoms are aggravated by lying down. She has tried prescription cough suppressant and a beta-agonist inhaler for the symptoms. The treatment provided mild relief. Her past medical history is significant for bronchitis. There is no history of asthma or COPD.       Constitution: Negative for chills, sweating, fatigue and fever.   HENT: Positive for congestion. Negative for ear pain, sinus pain, sinus pressure, sore throat, trouble swallowing and voice change.    Neck: Negative for neck pain, neck stiffness and painful lymph nodes.   Cardiovascular: Negative for chest pain, leg swelling and sob on exertion.   Eyes: Negative for eye discharge, eye itching and eye redness.   Respiratory: Positive for chest tightness, cough and sputum " "production. Negative for bloody sputum, COPD, shortness of breath, stridor, wheezing and asthma.    Gastrointestinal: Negative for abdominal pain, nausea, vomiting and diarrhea.   Musculoskeletal: Negative for joint pain, joint swelling and muscle ache.   Skin: Negative for color change, rash and lesion.   Allergic/Immunologic: Negative for seasonal allergies and asthma.   Neurological: Negative for dizziness, light-headedness and headaches.   Hematologic/Lymphatic: Negative for swollen lymph nodes.   Psychiatric/Behavioral: Negative for confusion, agitation and sleep disturbance.       Objective:       Vitals:    03/26/20 1622   BP: 138/79   Pulse: 84   Temp: 98.7 °F (37.1 °C)   SpO2: 98%   Weight: (!) 152 kg (335 lb)   Height: 5' 9" (1.753 m)     Physical Exam   Constitutional: She is oriented to person, place, and time. She appears well-developed and well-nourished. No distress.   HENT:   Head: Normocephalic and atraumatic.   Right Ear: Hearing and external ear normal.   Left Ear: Hearing and external ear normal.   Nose: Nose normal.   Mouth/Throat: Mucous membranes are normal.   Eyes: Conjunctivae, EOM and lids are normal. Right eye exhibits no discharge. Left eye exhibits no discharge.   Cardiovascular: Normal rate, regular rhythm and normal heart sounds.   No murmur heard.  Pulmonary/Chest: Effort normal. No respiratory distress. She has no wheezes. She has no rales.   Frequent non-productive cough present   Abdominal: Normal appearance.   Musculoskeletal: Normal range of motion. She exhibits no edema.   Neurological: She is alert and oriented to person, place, and time. No cranial nerve deficit (CN II-XII grossly intact).   Skin: Skin is warm, dry, not pale and no rash.   Psychiatric: Her speech is normal. Cognition and memory are normal.   Vitals reviewed.    Recent Lab Results       03/26/20  1639         Acceptable Yes     Rapid Influenza A Ag Negative     Rapid Influenza B Ag Negative     "         Assessment:       1. Bronchitis, allergic, unspecified asthma severity, with acute exacerbation        Plan:         Bronchitis, allergic, unspecified asthma severity, with acute exacerbation  -     POCT Influenza A/B  -     fluticasone propionate (FLONASE) 50 mcg/actuation nasal spray; 1 spray (50 mcg total) by Each Nostril route 2 (two) times daily. for 14 days  Dispense: 9.9 mL; Refill: 0  -     benzonatate (TESSALON) 100 MG capsule; Take 1-2 capsules (100-200 mg total) by mouth 3 (three) times daily as needed for Cough.  Dispense: 20 capsule; Refill: 0    Pt with hx of frequent bronchitis presenting with cough after what sounds like viral URI. Likely post-viral cough with possible allergic bronchitis component. Low suspicion for COVID based on lack of fever, 2wks of sx's, and hx of similar prior seasonal presentations.    Results, medications and diagnosis reviewed with patient, questions answered, and return precautions given    Follow up if symptoms worsen or fail to improve.    Den Deng MD/MPH  [facility]  [facility]

## 2020-07-23 ENCOUNTER — TELEPHONE (OUTPATIENT)
Dept: ORTHOPEDICS | Facility: CLINIC | Age: 45
End: 2020-07-23

## 2020-07-23 DIAGNOSIS — M25.551 HIP PAIN, RIGHT: Primary | ICD-10-CM

## 2020-09-25 ENCOUNTER — TELEPHONE (OUTPATIENT)
Dept: UROGYNECOLOGY | Facility: CLINIC | Age: 45
End: 2020-09-25

## 2020-09-25 RX ORDER — LIDOCAINE HYDROCHLORIDE 20 MG/ML
JELLY TOPICAL
Qty: 30 ML | Refills: 3 | Status: SHIPPED | OUTPATIENT
Start: 2020-09-25 | End: 2020-10-25

## 2020-09-25 NOTE — TELEPHONE ENCOUNTER
----- Message from Aparna Cordero sent at 9/25/2020 11:22 AM CDT -----  Regarding: Refill  Pt would like a call back concerning a medication that was given  to her in office to try.    Pt can be advised at [phone]    Thank You   Aparna Cristobal     Mom states that patient has been coughing and wheezing since this morning.  States that patient has cough, chest congestion and fevers for two days.  States that the highest fever was 101 F.  Denies: distress    Mom mentions that fever has been controlled with Tylenol and Motrin.  States that patient also has been administered OTC cold medication.    Mom offered earlier apt, but declined.  Appt made with Dr. Pastor at 1:30 pm with Dr. Pastor today.    Mom  notified and verbalized understanding of providers response. 3-way repeat back was completed on the information provided by the provider for verification and accuracy. Mom was in agreement and denied further questions or concerns.

## 2020-10-19 ENCOUNTER — OFFICE VISIT (OUTPATIENT)
Dept: ORTHOPEDICS | Facility: CLINIC | Age: 45
End: 2020-10-19
Payer: COMMERCIAL

## 2020-10-19 ENCOUNTER — HOSPITAL ENCOUNTER (OUTPATIENT)
Dept: RADIOLOGY | Facility: HOSPITAL | Age: 45
Discharge: HOME | End: 2020-10-19
Attending: PHYSICIAN ASSISTANT
Payer: COMMERCIAL

## 2020-10-19 ENCOUNTER — TELEPHONE (OUTPATIENT)
Dept: ORTHOPEDICS | Facility: CLINIC | Age: 45
End: 2020-10-19

## 2020-10-19 VITALS — HEIGHT: 69 IN | BODY MASS INDEX: 43.4 KG/M2 | WEIGHT: 293 LBS

## 2020-10-19 DIAGNOSIS — M22.42 PATELLA, CHONDROMALACIA, LEFT: Primary | ICD-10-CM

## 2020-10-19 DIAGNOSIS — M25.562 CHRONIC PAIN OF LEFT KNEE: Primary | ICD-10-CM

## 2020-10-19 DIAGNOSIS — M22.2X2 PATELLOFEMORAL PAIN SYNDROME OF LEFT KNEE: ICD-10-CM

## 2020-10-19 DIAGNOSIS — M25.562 CHRONIC PAIN OF LEFT KNEE: ICD-10-CM

## 2020-10-19 DIAGNOSIS — G89.29 CHRONIC PAIN OF LEFT KNEE: Primary | ICD-10-CM

## 2020-10-19 DIAGNOSIS — G89.29 CHRONIC PAIN OF LEFT KNEE: ICD-10-CM

## 2020-10-19 PROCEDURE — 73562 XR KNEE ORTHO LEFT: ICD-10-PCS | Mod: 26,LT,, | Performed by: RADIOLOGY

## 2020-10-19 PROCEDURE — 20610 LARGE JOINT ASPIRATION/INJECTION: L KNEE: ICD-10-PCS | Mod: LT,S$GLB,, | Performed by: PHYSICIAN ASSISTANT

## 2020-10-19 PROCEDURE — 99999 PR PBB SHADOW E&M-EST. PATIENT-LVL III: ICD-10-PCS | Mod: PBBFAC,,, | Performed by: PHYSICIAN ASSISTANT

## 2020-10-19 PROCEDURE — 73560 X-RAY EXAM OF KNEE 1 OR 2: CPT | Mod: TC,RT

## 2020-10-19 PROCEDURE — 20610 DRAIN/INJ JOINT/BURSA W/O US: CPT | Mod: LT,S$GLB,, | Performed by: PHYSICIAN ASSISTANT

## 2020-10-19 PROCEDURE — 73562 X-RAY EXAM OF KNEE 3: CPT | Mod: TC,LT

## 2020-10-19 PROCEDURE — 99999 PR PBB SHADOW E&M-EST. PATIENT-LVL III: CPT | Mod: PBBFAC,,, | Performed by: PHYSICIAN ASSISTANT

## 2020-10-19 PROCEDURE — 73562 X-RAY EXAM OF KNEE 3: CPT | Mod: 26,LT,, | Performed by: RADIOLOGY

## 2020-10-19 PROCEDURE — 3008F PR BODY MASS INDEX (BMI) DOCUMENTED: ICD-10-PCS | Mod: CPTII,S$GLB,, | Performed by: PHYSICIAN ASSISTANT

## 2020-10-19 PROCEDURE — 99203 PR OFFICE/OUTPT VISIT, NEW, LEVL III, 30-44 MIN: ICD-10-PCS | Mod: 25,S$GLB,, | Performed by: PHYSICIAN ASSISTANT

## 2020-10-19 PROCEDURE — 99203 OFFICE O/P NEW LOW 30 MIN: CPT | Mod: 25,S$GLB,, | Performed by: PHYSICIAN ASSISTANT

## 2020-10-19 PROCEDURE — 3008F BODY MASS INDEX DOCD: CPT | Mod: CPTII,S$GLB,, | Performed by: PHYSICIAN ASSISTANT

## 2020-10-19 RX ORDER — TRIAMCINOLONE ACETONIDE 40 MG/ML
40 INJECTION, SUSPENSION INTRA-ARTICULAR; INTRAMUSCULAR
Status: DISCONTINUED | OUTPATIENT
Start: 2020-10-19 | End: 2020-10-19 | Stop reason: HOSPADM

## 2020-10-19 RX ORDER — LIDOCAINE HYDROCHLORIDE 10 MG/ML
3 INJECTION INFILTRATION; PERINEURAL
Status: DISCONTINUED | OUTPATIENT
Start: 2020-10-19 | End: 2020-10-19 | Stop reason: HOSPADM

## 2020-10-19 RX ADMIN — TRIAMCINOLONE ACETONIDE 40 MG: 40 INJECTION, SUSPENSION INTRA-ARTICULAR; INTRAMUSCULAR at 04:10

## 2020-10-19 RX ADMIN — LIDOCAINE HYDROCHLORIDE 3 ML: 10 INJECTION INFILTRATION; PERINEURAL at 04:10

## 2020-10-19 NOTE — PROCEDURES
Large Joint Aspiration/Injection: L knee    Date/Time: 10/19/2020 4:00 PM  Performed by: Yas Greco PA-C  Authorized by: Yas Greco PA-C     Consent Done?:  Yes (Verbal)  Indications:  Pain  Timeout: prior to procedure the correct patient, procedure, and site was verified    Prep: patient was prepped and draped in usual sterile fashion    Local anesthetic:  Topical anesthetic    Details:  Needle Size:  22 G  Approach:  Anterolateral  Location:  Knee  Site:  L knee  Medications:  3 mL lidocaine HCL 10 mg/ml (1%) 10 mg/mL (1 %); 40 mg triamcinolone acetonide 40 mg/mL  Patient tolerance:  Patient tolerated the procedure well with no immediate complications

## 2020-10-19 NOTE — TELEPHONE ENCOUNTER
Attempt to contact patient in regards to appointment schedule for 4:00 today. Left message stating that  put in a x-ray order to be done at the [facility] today across the street from the hospital for 3:15pm today. Thanks.

## 2020-10-19 NOTE — PROGRESS NOTES
SUBJECTIVE:     Chief Complaint   Patient presents with    Left Knee - Pain       History of Present Illness:  Belinda Schultz is a 45 y.o. year old female here with a history of constant left knee pain which started about 5-6 months ago.  There is not a history of trauma.  The pain is located in the anterior aspect of the knee.  The pain is described as achy, 7/10.  There is not radiation.  There is not catching or locking.  Aggravating factors include going up and down stairs and prolonged sitting and standing.  Associated symptoms include knee giving out, popping sensation. Previous treatments include rest, OTC and prescription NSAIDS, CBD cream which have provided minimal relief.  There is not a history of previous injury or surgery to the knee.  The patient does not use an assistive device.    Review of patient's allergies indicates:  No Known Allergies      Current Outpatient Medications   Medication Sig Dispense Refill    lidocaine HCL 2% (XYLOCAINE) 2 % jelly Apply topically as needed. 30 mL 3     No current facility-administered medications for this visit.        History reviewed. No pertinent past medical history.    Past Surgical History:   Procedure Laterality Date     SECTION, CLASSIC      HYSTERECTOMY         Vital Signs (Most Recent)  There were no vitals filed for this visit.        Review of Systems:  ROS:  Constitutional: no fever or chills  Eyes: no visual changes  ENT: no nasal congestion or sore throat  Respiratory: no cough or shortness of breath  Cardiovascular: no chest pain or palpitations  Gastrointestinal: no nausea or vomiting, tolerating diet  Genitourinary: no hematuria or dysuria  Integument/Breast: no rash or pruritis  Hematologic/Lymphatic: no easy bruising or lymphadenopathy  Musculoskeletal: no arthralgias or myalgias  Neurological: no seizures or tremors  Behavioral/Psych: no auditory or visual hallucinations  Endocrine: no heat or cold intolerance      OBJECTIVE:  "    PHYSICAL EXAM:  Height: 5' 9" (175.3 cm) Weight: (!) 152.3 kg (335 lb 12.2 oz)   General: Well developed, well nourished, in no acute distress.  Neurological: Mood & affect are normal.  HEENT: NCAT, sclera nonicteric   Lungs: Respirations are equal and unlabored.   CV: 2+ bilateral upper and lower extremity pulses.   Skin: Intact throughout with no rashes, erythema, or lesions  Extremities: No LE edema, no erythema or warmth of the skin in either lower extremity.    left  Knee Exam:  Knee Range of Motion: 0-110   Effusion: none  Condition of skin: intact and no erythema or warmth  Location of tenderness:Medial joint line, Patella and Patellar tendon   + patella crepitus  Strength: 5 of 5 quadriceps strength and 5 of 5 hamstring strength  Stability:  Lachman: stable and PCL: stable  Varus /Valgus stress:  normal  Christine:  negative    Hip Examination:  full painless range of motion, without tenderness    IMAGING:    X-rays of the left knee, personally reviewed by me, demonstrate mild degenerative changes, osteophyte formation patella, subchondral sclerosis.  No fracture or dislocation.    ASSESSMENT/PLAN:   45 y.o. year old female with left knee patella chondromalacia, PTF pain    Plan: We discussed with the patient at length all the different treatment options available for the knee including anti-inflammatories, acetaminophen, rest, ice, knee strengthening exercise, occasional cortisone injections for temporary relief, Viscosupplimentation injections    -  Recommend home exercises- specifically quad strengthening, activity modification, ice, rest  - Left knee CSI today.  May take tylenol or OTC NSAIDS as needed.  Depending on relief from this injection, consider synvisc as she had good relief with this on her right knee a couple of years ago  - Follow up as needed      "

## 2021-01-08 NOTE — PLAN OF CARE
Cardiac Angiogram Discharge Instructions - Femoral    After you go home:      Have an adult stay with you until tomorrow.    Drink extra fluids for 2 days.    You may resume your normal diet.    No smoking       For 24 hours - due to the sedation you received:    Relax and take it easy.    Do NOT make any important or legal decisions.    Do NOT drive or operate machines at home or at work.    Do NOT drink alcohol.    Care of Groin Puncture Site:      For the first 24 hrs - check the puncture site every 1-2 hours while awake.    For 2 days, when you cough, sneeze, laugh or move your bowels, hold your hand over the puncture site and press firmly.    Remove the bandaid after 24 hours. If there is minor oozing, apply another bandaid and remove it after 12 hours.    It is normal to have a small bruise or pea size lump at the site.    You may shower tomorrow. Do NOT take a bath, or use a hot tub or pool for at least 3 days. Do NOT scrub the site. Do not use lotion or powder near the puncture site.    Activity:            For 2 days:    No stooping or squatting    Do NOT do any heavy activity such as exercise, lifting, or straining.     No housework, yard work or any activity that make you sweat    Do NOT lift more than 10 pounds    Bleeding:      If you start bleeding from the site in your groin, lie down flat and press firmly on/above the site for 10 minutes.     Once bleeding stops, lay flat for 2 hours.     Call [facility] as soon as you can.       Call 911 right away if you have heavy bleeding or bleeding that does not stop.      Medicines:      If you are taking an antiplatelet medication such as Plavix, Brilinta or Effient, do not stop taking it until you talk to your cardiologist.      If you are on Metformin (Glucophage), do not restart it until you have blood tests (within 2 to 3 days after discharge).  After you have your blood drawn, you may restart the Metformin.     Take your medications, including blood  [facility]  Physical Therapy Initial Evaluation    Name: Belinda Schultz  Clinic Number: [id number]    Therapy Diagnosis:   Encounter Diagnosis   Name Primary?    Chronic pain of right knee      Physician: Jamie Pleitez, *    Physician Orders: PT Eval and Treat   Medical Diagnosis: M22.2X1 (ICD-10-CM) - Patellofemoral pain syndrome of right knee M25.561 (ICD-10-CM) - Right knee pain, unspecified chronicity M17.11 (ICD-10-CM) - Primary osteoarthritis of right knee   Evaluation Date: 2018  Authorization Period Expiration: 18  Plan of Care Certification Period: 18  Visit # / Visits authorized:     Time In: 12:50 pm  Time Out: 1:25 pm  Total Billable Time: 30 minutes    Precautions: Standard    Subjective     Date of onset: 6+ months  History of current condition - Belinda reports: R knee pain of insidious onset that has progressively increased over the past month.  She c/o pain mainly with going up/downs stairs and at night.       No past medical history on file.  Belinda Schultz  has a past surgical history that includes Hysterectomy;  section, classic; INCISION AND DRAINAGE (I&D), ABSCESS (N/A, 2014); and COLONOSCOPY (N/A, 2014).    Belinda has a current medication list which includes the following prescription(s): ibuprofen, ketorolac, and meloxicam.    Review of patient's allergies indicates:  No Known Allergies     Imaging: x-ray:  There is a small amount of fluid in the right suprapatellar bursa and a small spur at the superior pole of the right patella.    On AP standing view there is slight loss of joint space in the medial tibial femoral compartments.  Otherwise joint spaces and cortical margins are smooth.    I detect no fracture, dislocation, radiopaque retained foreign body, lytic or blastic lesion, erosion or chondrocalcinosis.        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  Prior  Level of Function: independent with ADL  Current Level of Function: ADL limited by pain    Pain:  Current 0/10, worst 6/10, best 0/10   Location: right knee   Description: Aching  Aggravating Factors: Standing and Walking  Easing Factors: rest    Pts goals: decrease pain with activity    Objective     Postural examination:  Pt is obese     Functional assessment:   - walking:   Independent with a MIN limp             AROM:  0-90 deg flexion with pain at end ranges     MMT:   5/5 hams and 4/5 hip abd/quads    Tone:  Decreased R quads; POOR QS ability; no lag with SLR    Flexibility testing:   Tight hams    Special tests:   Neg knee stress tests    Palpation:   No TTP    Joint mobility: fair    Swelling:  mild      CMS Impairment/Limitation/Restriction for FOTO knee Survey    Therapist reviewed FOTO scores for Belinda Schultz on 9/24/2018.   FOTO documents entered into [employer] - see Media section.    Limitation Score: 62%  Category: Mobility    Current : CL = least 60% but < 80% impaired, limited or restricted  Goal: CK = at least 40% but < 60% impaired, limited or restricted         TREATMENT     Treatment Time In: 12:50pm  Treatment Time Out: 1:25 pm  Total Treatment time separate from Evaluation time: 15 min    Treatment:  HEP only    Home Exercises and Patient Education Provided    Education provided:   - icing for pain    Written Home Exercises Provided: yes.  Exercises were reviewed and Belinda was able to demonstrate them prior to the end of the session.  Belinda demonstrated good  understanding of the education provided.     See EMR under Media for exercises provided 9/24/2018.      Assessment     Belinda is a 43 y.o. female referred to outpatient Physical Therapy with a medical diagnosis of M22.2X1 (ICD-10-CM) - Patellofemoral pain syndrome of right knee M25.561 (ICD-10-CM) - Right knee pain, unspecified chronicity M17.11 (ICD-10-CM) - Primary osteoarthritis of right knee   .  Pt presents with R knee pain with LE  thinners, unless your provider tells you not to.      If you take Coumadin (Warfarin), have your INR checked by your provider in  3-5 days. Call your clinic to schedule this.    If you have stopped any medicines, check with your provider about when to restart them.    Follow Up Appointments:      Follow up with [facility] Nurse Practitioner at [facility] of patient preference in 7-10 days.    Call the clinic if:      You have increased pain or a large or growing hard lump around the site.    The site is red, swollen, hot or tender.    Blood or fluid is draining from the site.    You have chills or a fever greater than 101 F (38 C).    Your leg feels numb, cool or changes color.    You have hives, a rash or unusual itching.    New pain in the back or belly that you cannot control with Tylenol.    Any questions or concerns.          [facility] [place]:    [phone] [facility] (7 days a week)         weakness.    Pt prognosis is Fair.   Pt will benefit from skilled outpatient Physical Therapy to address the deficits stated above and in the chart below, provide pt/family education, and to maximize pt's level of independence.     Plan of care discussed with patient: Yes  Pt's spiritual, cultural and educational needs considered and patient is agreeable to the plan of care and goals as stated below:     Anticipated Barriers for therapy: none    Medical Necessity is demonstrated by the following:  History  Co-morbidities and personal factors that may impact the plan of care Co-morbidities:   high BMI    Personal Factors:   no deficits     low   Examination  Body Structures and Functions, activity limitations and participation restrictions that may impact the plan of care Body Regions:   lower extremities    Body Systems:    strength  gait    Participation Restrictions:   none    Activity limitations:   Learning and applying knowledge  no deficits    General Tasks and Commands  no deficits    Communication  no deficits    Mobility  walking    Self care  no deficits    Domestic Life  no deficits    Interactions/Relationships  no deficits    Life Areas  no deficits    Community and Social Life  no deficits         low   Clinical Presentation stable and uncomplicated low   Decision Making/ Complexity Score: low     Goals:  Short Term Goals: 2 weeks         1.   Independent with HEP        2.  Pt will report decreased pain level of < 50% from above measure with ADL    Long Term Goals:   GOALS:    8_   weeks. Pt agrees with goals set.  1. Independent with HEP.  2. Report decreased    R knee    pain  <   / =  3/10 with ADL such as walking  3. Increased MMT  for  R LE to 4+/5 to 5/5  with ADL such as walking  4. Increased arom  for  R knee to 100 deg flexion with functional activities such walking or self-care      Plan     Certification Period/Plan of care expiration: 9/24/2018 to 11-19-18.    Outpatient Physical Therapy 2  times weekly for 8 weeks to include the following interventions: Manual Therapy, Moist Heat/ Ice, Patient Education and Therapeutic Exercise.     Troy Roque, PT

## 2021-05-04 ENCOUNTER — PATIENT MESSAGE (OUTPATIENT)
Dept: RESEARCH | Facility: HOSPITAL | Age: 46
End: 2021-05-04

## 2021-05-09 ENCOUNTER — HOSPITAL ENCOUNTER (EMERGENCY)
Facility: HOSPITAL | Age: 46
Discharge: HOME | End: 2021-05-09
Attending: EMERGENCY MEDICINE
Payer: COMMERCIAL

## 2021-05-09 VITALS
HEART RATE: 63 BPM | SYSTOLIC BLOOD PRESSURE: 143 MMHG | OXYGEN SATURATION: 100 % | BODY MASS INDEX: 43.4 KG/M2 | RESPIRATION RATE: 19 BRPM | WEIGHT: 293 LBS | DIASTOLIC BLOOD PRESSURE: 67 MMHG | HEIGHT: 69 IN | TEMPERATURE: 98 F

## 2021-05-09 DIAGNOSIS — R10.9 RIGHT FLANK PAIN: Primary | ICD-10-CM

## 2021-05-09 DIAGNOSIS — R07.81 RIB PAIN ON RIGHT SIDE: ICD-10-CM

## 2021-05-09 LAB
ALBUMIN SERPL BCP-MCNC: 3.2 G/DL (ref 3.5–5.2)
ALP SERPL-CCNC: 66 U/L (ref 55–135)
ALT SERPL W/O P-5'-P-CCNC: 11 U/L (ref 10–44)
ANION GAP SERPL CALC-SCNC: 9 MMOL/L (ref 8–16)
AST SERPL-CCNC: 11 U/L (ref 10–40)
BASOPHILS # BLD AUTO: 0.02 K/UL (ref 0–0.2)
BASOPHILS NFR BLD: 0.3 % (ref 0–1.9)
BILIRUB SERPL-MCNC: 0.3 MG/DL (ref 0.1–1)
BILIRUB UR QL STRIP: NEGATIVE
BUN SERPL-MCNC: 10 MG/DL (ref 6–20)
CALCIUM SERPL-MCNC: 8.3 MG/DL (ref 8.7–10.5)
CHLORIDE SERPL-SCNC: 104 MMOL/L (ref 95–110)
CLARITY UR: CLEAR
CO2 SERPL-SCNC: 25 MMOL/L (ref 23–29)
COLOR UR: YELLOW
CREAT SERPL-MCNC: 0.7 MG/DL (ref 0.5–1.4)
D DIMER PPP IA.FEU-MCNC: 0.25 MG/L FEU
DIFFERENTIAL METHOD: ABNORMAL
EOSINOPHIL # BLD AUTO: 0.1 K/UL (ref 0–0.5)
EOSINOPHIL NFR BLD: 1.4 % (ref 0–8)
ERYTHROCYTE [DISTWIDTH] IN BLOOD BY AUTOMATED COUNT: 13.6 % (ref 11.5–14.5)
EST. GFR  (AFRICAN AMERICAN): >60 ML/MIN/1.73 M^2
EST. GFR  (NON AFRICAN AMERICAN): >60 ML/MIN/1.73 M^2
GLUCOSE SERPL-MCNC: 106 MG/DL (ref 70–110)
GLUCOSE UR QL STRIP: NEGATIVE
HCT VFR BLD AUTO: 36.6 % (ref 37–48.5)
HGB BLD-MCNC: 12.1 G/DL (ref 12–16)
HGB UR QL STRIP: NEGATIVE
IMM GRANULOCYTES # BLD AUTO: 0.02 K/UL (ref 0–0.04)
IMM GRANULOCYTES NFR BLD AUTO: 0.3 % (ref 0–0.5)
KETONES UR QL STRIP: NEGATIVE
LEUKOCYTE ESTERASE UR QL STRIP: NEGATIVE
LIPASE SERPL-CCNC: 59 U/L (ref 4–60)
LYMPHOCYTES # BLD AUTO: 2.5 K/UL (ref 1–4.8)
LYMPHOCYTES NFR BLD: 37.7 % (ref 18–48)
MCH RBC QN AUTO: 29.8 PG (ref 27–31)
MCHC RBC AUTO-ENTMCNC: 33.1 G/DL (ref 32–36)
MCV RBC AUTO: 90 FL (ref 82–98)
MONOCYTES # BLD AUTO: 0.5 K/UL (ref 0.3–1)
MONOCYTES NFR BLD: 7.6 % (ref 4–15)
NEUTROPHILS # BLD AUTO: 3.5 K/UL (ref 1.8–7.7)
NEUTROPHILS NFR BLD: 52.7 % (ref 38–73)
NITRITE UR QL STRIP: NEGATIVE
NRBC BLD-RTO: 0 /100 WBC
PH UR STRIP: 8 [PH] (ref 5–8)
PLATELET # BLD AUTO: 302 K/UL (ref 150–450)
PMV BLD AUTO: 9.2 FL (ref 9.2–12.9)
POTASSIUM SERPL-SCNC: 4 MMOL/L (ref 3.5–5.1)
PROT SERPL-MCNC: 6.9 G/DL (ref 6–8.4)
PROT UR QL STRIP: NEGATIVE
RBC # BLD AUTO: 4.06 M/UL (ref 4–5.4)
SODIUM SERPL-SCNC: 138 MMOL/L (ref 136–145)
SP GR UR STRIP: 1 (ref 1–1.03)
TROPONIN I SERPL DL<=0.01 NG/ML-MCNC: <0.006 NG/ML (ref 0–0.03)
URN SPEC COLLECT METH UR: NORMAL
UROBILINOGEN UR STRIP-ACNC: NEGATIVE EU/DL
WBC # BLD AUTO: 6.57 K/UL (ref 3.9–12.7)

## 2021-05-09 PROCEDURE — 80053 COMPREHEN METABOLIC PANEL: CPT | Performed by: EMERGENCY MEDICINE

## 2021-05-09 PROCEDURE — 93005 ELECTROCARDIOGRAM TRACING: CPT

## 2021-05-09 PROCEDURE — 96374 THER/PROPH/DIAG INJ IV PUSH: CPT

## 2021-05-09 PROCEDURE — 93010 EKG 12-LEAD: ICD-10-PCS | Mod: ,,, | Performed by: INTERNAL MEDICINE

## 2021-05-09 PROCEDURE — 93010 ELECTROCARDIOGRAM REPORT: CPT | Mod: ,,, | Performed by: INTERNAL MEDICINE

## 2021-05-09 PROCEDURE — 63600175 PHARM REV CODE 636 W HCPCS: Performed by: EMERGENCY MEDICINE

## 2021-05-09 PROCEDURE — 85379 FIBRIN DEGRADATION QUANT: CPT | Performed by: EMERGENCY MEDICINE

## 2021-05-09 PROCEDURE — 84484 ASSAY OF TROPONIN QUANT: CPT | Performed by: EMERGENCY MEDICINE

## 2021-05-09 PROCEDURE — 99285 EMERGENCY DEPT VISIT HI MDM: CPT | Mod: 25

## 2021-05-09 PROCEDURE — 83690 ASSAY OF LIPASE: CPT | Performed by: EMERGENCY MEDICINE

## 2021-05-09 PROCEDURE — 85025 COMPLETE CBC W/AUTO DIFF WBC: CPT | Performed by: EMERGENCY MEDICINE

## 2021-05-09 PROCEDURE — 81003 URINALYSIS AUTO W/O SCOPE: CPT | Performed by: EMERGENCY MEDICINE

## 2021-05-09 RX ORDER — KETOROLAC TROMETHAMINE 10 MG/1
10 TABLET, FILM COATED ORAL EVERY 6 HOURS PRN
Qty: 12 TABLET | Refills: 0 | Status: SHIPPED | OUTPATIENT
Start: 2021-05-09 | End: 2021-05-12

## 2021-05-09 RX ORDER — LIDOCAINE 560 MG/1
1 PATCH PERCUTANEOUS; TOPICAL; TRANSDERMAL EVERY 12 HOURS
Qty: 5 PATCH | Refills: 0 | Status: SHIPPED | OUTPATIENT
Start: 2021-05-09 | End: 2021-06-17

## 2021-05-09 RX ORDER — ORPHENADRINE CITRATE 100 MG/1
100 TABLET, EXTENDED RELEASE ORAL 2 TIMES DAILY PRN
Qty: 20 TABLET | Refills: 0 | Status: SHIPPED | OUTPATIENT
Start: 2021-05-09 | End: 2021-06-17

## 2021-05-09 RX ORDER — KETOROLAC TROMETHAMINE 30 MG/ML
30 INJECTION, SOLUTION INTRAMUSCULAR; INTRAVENOUS
Status: COMPLETED | OUTPATIENT
Start: 2021-05-09 | End: 2021-05-09

## 2021-05-09 RX ADMIN — KETOROLAC TROMETHAMINE 30 MG: 30 INJECTION, SOLUTION INTRAMUSCULAR; INTRAVENOUS at 10:05

## 2021-08-03 ENCOUNTER — HOSPITAL ENCOUNTER (OUTPATIENT)
Dept: RADIOLOGY | Facility: HOSPITAL | Age: 46
Discharge: HOME | End: 2021-08-03
Attending: INTERNAL MEDICINE
Payer: COMMERCIAL

## 2021-08-03 ENCOUNTER — LAB VISIT (OUTPATIENT)
Dept: LAB | Facility: HOSPITAL | Age: 46
End: 2021-08-03
Attending: INTERNAL MEDICINE
Payer: COMMERCIAL

## 2021-08-03 ENCOUNTER — OFFICE VISIT (OUTPATIENT)
Dept: INTERNAL MEDICINE | Facility: CLINIC | Age: 46
End: 2021-08-03
Payer: COMMERCIAL

## 2021-08-03 VITALS
RESPIRATION RATE: 14 BRPM | BODY MASS INDEX: 43.4 KG/M2 | SYSTOLIC BLOOD PRESSURE: 124 MMHG | HEART RATE: 74 BPM | DIASTOLIC BLOOD PRESSURE: 78 MMHG | WEIGHT: 293 LBS | HEIGHT: 69 IN

## 2021-08-03 DIAGNOSIS — R10.9 CHRONIC RIGHT FLANK PAIN: Primary | ICD-10-CM

## 2021-08-03 DIAGNOSIS — G89.29 CHRONIC RIGHT FLANK PAIN: ICD-10-CM

## 2021-08-03 DIAGNOSIS — R10.9 CHRONIC RIGHT FLANK PAIN: ICD-10-CM

## 2021-08-03 DIAGNOSIS — G89.29 CHRONIC RIGHT FLANK PAIN: Primary | ICD-10-CM

## 2021-08-03 LAB
ALBUMIN SERPL BCP-MCNC: 3.5 G/DL (ref 3.5–5.2)
ALP SERPL-CCNC: 74 U/L (ref 55–135)
ALT SERPL W/O P-5'-P-CCNC: 11 U/L (ref 10–44)
ANION GAP SERPL CALC-SCNC: 9 MMOL/L (ref 8–16)
AST SERPL-CCNC: 11 U/L (ref 10–40)
BASOPHILS # BLD AUTO: 0.02 K/UL (ref 0–0.2)
BASOPHILS NFR BLD: 0.3 % (ref 0–1.9)
BILIRUB SERPL-MCNC: 0.3 MG/DL (ref 0.1–1)
BILIRUB UR QL STRIP: NEGATIVE
BUN SERPL-MCNC: 11 MG/DL (ref 6–20)
CALCIUM SERPL-MCNC: 9.4 MG/DL (ref 8.7–10.5)
CHLORIDE SERPL-SCNC: 102 MMOL/L (ref 95–110)
CLARITY UR REFRACT.AUTO: ABNORMAL
CO2 SERPL-SCNC: 25 MMOL/L (ref 23–29)
COLOR UR AUTO: YELLOW
CREAT SERPL-MCNC: 0.7 MG/DL (ref 0.5–1.4)
DIFFERENTIAL METHOD: ABNORMAL
EOSINOPHIL # BLD AUTO: 0.1 K/UL (ref 0–0.5)
EOSINOPHIL NFR BLD: 0.9 % (ref 0–8)
ERYTHROCYTE [DISTWIDTH] IN BLOOD BY AUTOMATED COUNT: 13.6 % (ref 11.5–14.5)
EST. GFR  (AFRICAN AMERICAN): >60 ML/MIN/1.73 M^2
EST. GFR  (NON AFRICAN AMERICAN): >60 ML/MIN/1.73 M^2
GLUCOSE SERPL-MCNC: 84 MG/DL (ref 70–110)
GLUCOSE UR QL STRIP: NEGATIVE
HCT VFR BLD AUTO: 40.4 % (ref 37–48.5)
HGB BLD-MCNC: 12.8 G/DL (ref 12–16)
HGB UR QL STRIP: NEGATIVE
IMM GRANULOCYTES # BLD AUTO: 0.02 K/UL (ref 0–0.04)
IMM GRANULOCYTES NFR BLD AUTO: 0.3 % (ref 0–0.5)
KETONES UR QL STRIP: NEGATIVE
LEUKOCYTE ESTERASE UR QL STRIP: NEGATIVE
LYMPHOCYTES # BLD AUTO: 2.9 K/UL (ref 1–4.8)
LYMPHOCYTES NFR BLD: 37.4 % (ref 18–48)
MCH RBC QN AUTO: 30 PG (ref 27–31)
MCHC RBC AUTO-ENTMCNC: 31.7 G/DL (ref 32–36)
MCV RBC AUTO: 95 FL (ref 82–98)
MONOCYTES # BLD AUTO: 0.6 K/UL (ref 0.3–1)
MONOCYTES NFR BLD: 7.6 % (ref 4–15)
NEUTROPHILS # BLD AUTO: 4.1 K/UL (ref 1.8–7.7)
NEUTROPHILS NFR BLD: 53.5 % (ref 38–73)
NITRITE UR QL STRIP: NEGATIVE
NRBC BLD-RTO: 0 /100 WBC
PH UR STRIP: 7 [PH] (ref 5–8)
PLATELET # BLD AUTO: 315 K/UL (ref 150–450)
PMV BLD AUTO: 10.1 FL (ref 9.2–12.9)
POTASSIUM SERPL-SCNC: 4.3 MMOL/L (ref 3.5–5.1)
PROT SERPL-MCNC: 7.5 G/DL (ref 6–8.4)
PROT UR QL STRIP: NEGATIVE
RBC # BLD AUTO: 4.26 M/UL (ref 4–5.4)
SODIUM SERPL-SCNC: 136 MMOL/L (ref 136–145)
SP GR UR STRIP: 1.02 (ref 1–1.03)
URN SPEC COLLECT METH UR: ABNORMAL
WBC # BLD AUTO: 7.67 K/UL (ref 3.9–12.7)

## 2021-08-03 PROCEDURE — 85025 COMPLETE CBC W/AUTO DIFF WBC: CPT | Performed by: INTERNAL MEDICINE

## 2021-08-03 PROCEDURE — 99213 PR OFFICE/OUTPT VISIT, EST, LEVL III, 20-29 MIN: ICD-10-PCS | Mod: S$GLB,,, | Performed by: INTERNAL MEDICINE

## 2021-08-03 PROCEDURE — 36415 COLL VENOUS BLD VENIPUNCTURE: CPT | Performed by: INTERNAL MEDICINE

## 2021-08-03 PROCEDURE — 3078F DIAST BP <80 MM HG: CPT | Mod: CPTII,S$GLB,, | Performed by: INTERNAL MEDICINE

## 2021-08-03 PROCEDURE — 3074F SYST BP LT 130 MM HG: CPT | Mod: CPTII,S$GLB,, | Performed by: INTERNAL MEDICINE

## 2021-08-03 PROCEDURE — 1160F PR REVIEW ALL MEDS BY PRESCRIBER/CLIN PHARMACIST DOCUMENTED: ICD-10-PCS | Mod: CPTII,S$GLB,, | Performed by: INTERNAL MEDICINE

## 2021-08-03 PROCEDURE — 3008F PR BODY MASS INDEX (BMI) DOCUMENTED: ICD-10-PCS | Mod: CPTII,S$GLB,, | Performed by: INTERNAL MEDICINE

## 2021-08-03 PROCEDURE — 76770 US RETROPERITONEAL COMPLETE: ICD-10-PCS | Mod: 26,,, | Performed by: STUDENT IN AN ORGANIZED HEALTH CARE EDUCATION/TRAINING PROGRAM

## 2021-08-03 PROCEDURE — 76770 US EXAM ABDO BACK WALL COMP: CPT | Mod: 26,,, | Performed by: STUDENT IN AN ORGANIZED HEALTH CARE EDUCATION/TRAINING PROGRAM

## 2021-08-03 PROCEDURE — 1159F PR MEDICATION LIST DOCUMENTED IN MEDICAL RECORD: ICD-10-PCS | Mod: CPTII,S$GLB,, | Performed by: INTERNAL MEDICINE

## 2021-08-03 PROCEDURE — 99999 PR PBB SHADOW E&M-EST. PATIENT-LVL IV: ICD-10-PCS | Mod: PBBFAC,,, | Performed by: INTERNAL MEDICINE

## 2021-08-03 PROCEDURE — 3074F PR MOST RECENT SYSTOLIC BLOOD PRESSURE < 130 MM HG: ICD-10-PCS | Mod: CPTII,S$GLB,, | Performed by: INTERNAL MEDICINE

## 2021-08-03 PROCEDURE — 1159F MED LIST DOCD IN RCRD: CPT | Mod: CPTII,S$GLB,, | Performed by: INTERNAL MEDICINE

## 2021-08-03 PROCEDURE — 81003 URINALYSIS AUTO W/O SCOPE: CPT | Performed by: INTERNAL MEDICINE

## 2021-08-03 PROCEDURE — 99999 PR PBB SHADOW E&M-EST. PATIENT-LVL IV: CPT | Mod: PBBFAC,,, | Performed by: INTERNAL MEDICINE

## 2021-08-03 PROCEDURE — 99213 OFFICE O/P EST LOW 20 MIN: CPT | Mod: S$GLB,,, | Performed by: INTERNAL MEDICINE

## 2021-08-03 PROCEDURE — 3078F PR MOST RECENT DIASTOLIC BLOOD PRESSURE < 80 MM HG: ICD-10-PCS | Mod: CPTII,S$GLB,, | Performed by: INTERNAL MEDICINE

## 2021-08-03 PROCEDURE — 76770 US EXAM ABDO BACK WALL COMP: CPT | Mod: TC

## 2021-08-03 PROCEDURE — 3008F BODY MASS INDEX DOCD: CPT | Mod: CPTII,S$GLB,, | Performed by: INTERNAL MEDICINE

## 2021-08-03 PROCEDURE — 80053 COMPREHEN METABOLIC PANEL: CPT | Performed by: INTERNAL MEDICINE

## 2021-08-03 PROCEDURE — 1160F RVW MEDS BY RX/DR IN RCRD: CPT | Mod: CPTII,S$GLB,, | Performed by: INTERNAL MEDICINE

## 2021-08-03 RX ORDER — HYDROCODONE BITARTRATE AND ACETAMINOPHEN 5; 325 MG/1; MG/1
1 TABLET ORAL EVERY 6 HOURS PRN
Qty: 20 TABLET | Refills: 0 | Status: SHIPPED | OUTPATIENT
Start: 2021-08-03 | End: 2021-11-15

## 2021-11-15 ENCOUNTER — LAB VISIT (OUTPATIENT)
Dept: LAB | Facility: HOSPITAL | Age: 46
End: 2021-11-15
Attending: FAMILY MEDICINE
Payer: COMMERCIAL

## 2021-11-15 DIAGNOSIS — N39.0 URINARY TRACT INFECTION WITHOUT HEMATURIA, SITE UNSPECIFIED: ICD-10-CM

## 2021-11-15 PROCEDURE — 87086 URINE CULTURE/COLONY COUNT: CPT | Performed by: FAMILY MEDICINE

## 2021-11-16 LAB — BACTERIA UR CULT: NO GROWTH

## 2022-07-07 ENCOUNTER — OFFICE VISIT (OUTPATIENT)
Dept: PULMONOLOGY | Facility: CLINIC | Age: 47
End: 2022-07-07
Payer: COMMERCIAL

## 2022-07-07 ENCOUNTER — HOSPITAL ENCOUNTER (OUTPATIENT)
Dept: PULMONOLOGY | Facility: CLINIC | Age: 47
Discharge: HOME | End: 2022-07-07
Payer: COMMERCIAL

## 2022-07-07 ENCOUNTER — TELEPHONE (OUTPATIENT)
Dept: UROGYNECOLOGY | Facility: CLINIC | Age: 47
End: 2022-07-07
Payer: COMMERCIAL

## 2022-07-07 VITALS
WEIGHT: 293 LBS | OXYGEN SATURATION: 100 % | DIASTOLIC BLOOD PRESSURE: 70 MMHG | SYSTOLIC BLOOD PRESSURE: 136 MMHG | HEART RATE: 72 BPM | HEIGHT: 69 IN | BODY MASS INDEX: 43.4 KG/M2

## 2022-07-07 DIAGNOSIS — J42 CHRONIC BRONCHITIS WITH ACUTE EXACERBATION: ICD-10-CM

## 2022-07-07 DIAGNOSIS — J20.9 CHRONIC BRONCHITIS WITH ACUTE EXACERBATION: Primary | ICD-10-CM

## 2022-07-07 DIAGNOSIS — E66.01 MORBID OBESITY WITH BMI OF 45.0-49.9, ADULT: ICD-10-CM

## 2022-07-07 DIAGNOSIS — J42 CHRONIC BRONCHITIS WITH ACUTE EXACERBATION: Primary | ICD-10-CM

## 2022-07-07 DIAGNOSIS — J20.9 CHRONIC BRONCHITIS WITH ACUTE EXACERBATION: ICD-10-CM

## 2022-07-07 PROCEDURE — 3008F BODY MASS INDEX DOCD: CPT | Mod: CPTII,S$GLB,, | Performed by: INTERNAL MEDICINE

## 2022-07-07 PROCEDURE — 94729 PR C02/MEMBANE DIFFUSE CAPACITY: ICD-10-PCS | Mod: S$GLB,,, | Performed by: INTERNAL MEDICINE

## 2022-07-07 PROCEDURE — 99204 PR OFFICE/OUTPT VISIT, NEW, LEVL IV, 45-59 MIN: ICD-10-PCS | Mod: S$GLB,,, | Performed by: INTERNAL MEDICINE

## 2022-07-07 PROCEDURE — 94727 GAS DIL/WSHOT DETER LNG VOL: CPT | Mod: S$GLB,,, | Performed by: INTERNAL MEDICINE

## 2022-07-07 PROCEDURE — 1160F RVW MEDS BY RX/DR IN RCRD: CPT | Mod: CPTII,S$GLB,, | Performed by: INTERNAL MEDICINE

## 2022-07-07 PROCEDURE — 94060 EVALUATION OF WHEEZING: CPT | Mod: S$GLB,,, | Performed by: INTERNAL MEDICINE

## 2022-07-07 PROCEDURE — 1160F PR REVIEW ALL MEDS BY PRESCRIBER/CLIN PHARMACIST DOCUMENTED: ICD-10-PCS | Mod: CPTII,S$GLB,, | Performed by: INTERNAL MEDICINE

## 2022-07-07 PROCEDURE — 3075F SYST BP GE 130 - 139MM HG: CPT | Mod: CPTII,S$GLB,, | Performed by: INTERNAL MEDICINE

## 2022-07-07 PROCEDURE — 99999 PR PBB SHADOW E&M-EST. PATIENT-LVL III: ICD-10-PCS | Mod: PBBFAC,,, | Performed by: INTERNAL MEDICINE

## 2022-07-07 PROCEDURE — 1159F MED LIST DOCD IN RCRD: CPT | Mod: CPTII,S$GLB,, | Performed by: INTERNAL MEDICINE

## 2022-07-07 PROCEDURE — 94060 PR EVAL OF BRONCHOSPASM: ICD-10-PCS | Mod: S$GLB,,, | Performed by: INTERNAL MEDICINE

## 2022-07-07 PROCEDURE — 3008F PR BODY MASS INDEX (BMI) DOCUMENTED: ICD-10-PCS | Mod: CPTII,S$GLB,, | Performed by: INTERNAL MEDICINE

## 2022-07-07 PROCEDURE — 99204 OFFICE O/P NEW MOD 45 MIN: CPT | Mod: S$GLB,,, | Performed by: INTERNAL MEDICINE

## 2022-07-07 PROCEDURE — 1159F PR MEDICATION LIST DOCUMENTED IN MEDICAL RECORD: ICD-10-PCS | Mod: CPTII,S$GLB,, | Performed by: INTERNAL MEDICINE

## 2022-07-07 PROCEDURE — 94729 DIFFUSING CAPACITY: CPT | Mod: S$GLB,,, | Performed by: INTERNAL MEDICINE

## 2022-07-07 PROCEDURE — 3078F DIAST BP <80 MM HG: CPT | Mod: CPTII,S$GLB,, | Performed by: INTERNAL MEDICINE

## 2022-07-07 PROCEDURE — 99999 PR PBB SHADOW E&M-EST. PATIENT-LVL III: CPT | Mod: PBBFAC,,, | Performed by: INTERNAL MEDICINE

## 2022-07-07 PROCEDURE — 3075F PR MOST RECENT SYSTOLIC BLOOD PRESS GE 130-139MM HG: ICD-10-PCS | Mod: CPTII,S$GLB,, | Performed by: INTERNAL MEDICINE

## 2022-07-07 PROCEDURE — 3078F PR MOST RECENT DIASTOLIC BLOOD PRESSURE < 80 MM HG: ICD-10-PCS | Mod: CPTII,S$GLB,, | Performed by: INTERNAL MEDICINE

## 2022-07-07 PROCEDURE — 94727 PR PULM FUNCTION TEST BY GAS: ICD-10-PCS | Mod: S$GLB,,, | Performed by: INTERNAL MEDICINE

## 2022-07-07 RX ORDER — METHYLPREDNISOLONE 4 MG/1
TABLET ORAL
Qty: 21 EACH | Refills: 0 | Status: SHIPPED | OUTPATIENT
Start: 2022-07-07 | End: 2022-07-28

## 2022-07-07 RX ORDER — FLUTICASONE FUROATE AND VILANTEROL 200; 25 UG/1; UG/1
1 POWDER RESPIRATORY (INHALATION) DAILY
Qty: 1 EACH | Refills: 5 | Status: SHIPPED | OUTPATIENT
Start: 2022-07-07 | End: 2022-09-12

## 2022-07-07 RX ORDER — BENZONATATE 100 MG/1
100 CAPSULE ORAL 3 TIMES DAILY PRN
COMMUNITY
Start: 2022-06-09 | End: 2022-09-12

## 2022-07-07 RX ORDER — ALBUTEROL SULFATE 90 UG/1
AEROSOL, METERED RESPIRATORY (INHALATION)
COMMUNITY
Start: 2022-07-01 | End: 2022-09-09 | Stop reason: CLARIF

## 2022-07-07 RX ORDER — MONTELUKAST SODIUM 10 MG/1
10 TABLET ORAL DAILY PRN
COMMUNITY
Start: 2022-06-09 | End: 2022-09-12

## 2022-07-07 NOTE — PROGRESS NOTES
Subjective:       Patient ID: Belinda Schultz is a 47 y.o. female.    Chief Complaint: Cough    HPI:   Belinda Schultz is a 47 y.o. female new to me who presents for evaluation of a cough.    Yearly URI the last 4 years followed by cough productive of clear sputum for 3 months. Last episode was in April. Continues to have symptoms. Denies nighttime cough. Reports dyspnea with speech. Denies dyspnea with activity. There is variation from day to day    Denies chest pain or palpitations    Denies f/c/ns, weight loss, malaise, fatigue    Denies rashes, myalgias, arthralgias, hair/nail changes, dysphagia, eye changes, raynauds, mucosal ulcerations    Exposures:  Work- Indoors  Tobacco- Denies  Inhalational Agents- Denies  Mold- Denies  Pets- 2 dogs  Birds- Denies  Medications- n/a    Childhood history of Lung Disease: bronchitis    Review of Systems   Constitutional: Negative for fever, chills, weight loss, activity change, appetite change, night sweats and weakness.   HENT: Negative for postnasal drip, sinus pressure, voice change and congestion.    Eyes: Negative for redness.   Respiratory: Positive for cough and sputum production. Negative for shortness of breath, wheezing, dyspnea on extertion, use of rescue inhaler and somnolence.    Cardiovascular: Negative for chest pain, palpitations and leg swelling.   Musculoskeletal: Negative for joint swelling and myalgias.   Skin: Negative for rash.   Gastrointestinal: Negative for acid reflux.   Neurological: Negative for syncope and weakness.   Psychiatric/Behavioral: Negative for sleep disturbance.         Social History     Tobacco Use    Smoking status: Never Smoker    Smokeless tobacco: Never Used   Substance Use Topics    Alcohol use: Yes     Comment: 1 d rink yesterday       Review of patient's allergies indicates:  No Known Allergies  No past medical history on file.  Past Surgical History:   Procedure Laterality Date     SECTION, CLASSIC      HYSTERECTOMY   "     Current Outpatient Medications on File Prior to Visit   Medication Sig    albuterol (PROVENTIL/VENTOLIN HFA) 90 mcg/actuation inhaler Inhale into the lungs.    benzonatate (TESSALON) 100 MG capsule Take 100 mg by mouth 3 (three) times daily as needed.    dextroamphetamine-amphetamine (ADDERALL XR) 15 MG 24 hr capsule Take by mouth one capsule every morning    diclofenac (VOLTAREN) 75 MG EC tablet Take 75 mg by mouth 2 (two) times daily.    ergocalciferol (ERGOCALCIFEROL) 50,000 unit Cap Take 50,000 Units by mouth every 7 days.    montelukast (SINGULAIR) 10 mg tablet Take 10 mg by mouth once daily.     No current facility-administered medications on file prior to visit.       Objective:      Vitals:    07/07/22 1012   BP: 136/70   BP Location: Right arm   Patient Position: Sitting   Pulse: 72   SpO2: 100%   Weight: (!) 148.6 kg (327 lb 9.7 oz)   Height: 5' 9" (1.753 m)     Physical Exam   Constitutional: She is oriented to person, place, and time. She appears well-developed and well-nourished. She is obese.   HENT:   Nose: No mucosal edema.   Mouth/Throat: Oropharynx is clear and moist. Mallampati Score: IV.   Neck: No tracheal deviation present.   Cardiovascular: Normal rate, regular rhythm and intact distal pulses.   Pulmonary/Chest: Normal expansion, symmetric chest wall expansion and effort normal. No respiratory distress. She has wheezes. She has no rhonchi. She has no rales.   Abdominal: She exhibits no distension.   Musculoskeletal:         General: No edema.      Cervical back: Neck supple.   Lymphadenopathy: No supraclavicular adenopathy is present.     She has no cervical adenopathy.   Neurological: She is alert and oriented to person, place, and time.   Skin: Skin is warm and dry. No cyanosis or erythema. Nails show no clubbing.   Psychiatric: She has a normal mood and affect.   Nursing note and vitals reviewed.      Personal Diagnostic Review    Laboratory:  8/3/21 Bicarb- 25    CXR 5/9/21- " Images personally reviewed. I agree w/ the radiologist who notes  No acute process    PFTs 7/8/22- Personally reviewed, PFTs are consistent with mild restriction and mild reduction in DLCO without a significant change in berenice after BD    No flowsheet data found.        Assessment:     Problem List Items Addressed This Visit        Pulmonary    Chronic bronchitis with acute exacerbation - Primary     Yearly episodes for the last 4 years with cough and sputum production last several months. PFTs w/ mild restriction and mild reduction in DLCO which is likely 2/2 obesity.     - Start an ICS/LABA  - LORENZO  - Medrol dose pack  - Encouraged regular, progressive exercise           Relevant Medications    fluticasone furoate-vilanteroL (BREO ELLIPTA) 200-25 mcg/dose DsDv diskus inhaler    methylPREDNISolone (MEDROL DOSEPACK) 4 mg tablet    Other Relevant Orders    Spirometry with/without bronchodilator (Completed)    Lung Volumes    DLCO-Carbon Monoxide Diffusing Capacity       Endocrine    Morbid obesity with BMI of 45.0-49.9, adult     Encourage diet and exercise. No s/s of KELLY                 45 minutes of total time spent on the encounter, which includes face to face time and non-face to face time preparing to see the patient (eg, review of tests), Obtaining and/or reviewing separately obtained history, Documenting clinical information in the electronic or other health record, Independently interpreting results (not separately reported) and communicating results to the patient/family/caregiver, or Care coordination (not separately reported).

## 2022-07-07 NOTE — TELEPHONE ENCOUNTER
"----- Message from Gemma Virk sent at 7/7/2022  1:11 PM CDT -----  Regarding: Returninf Call            Name of Who is Calling:  VINCENZO TURNER    Who Left The Message:   VINCENZO TURNER    Message Is For:  Chey Carr MA      What is the request in detail:      Patient called stating, "she's returning the Office's call and would like you to please call again."  Please further advise.    Thank you!      Reply by [facility]:  No      Preferred Call Back  : [phone] (m)                                           "

## 2022-07-08 ENCOUNTER — TELEPHONE (OUTPATIENT)
Dept: UROGYNECOLOGY | Facility: CLINIC | Age: 47
End: 2022-07-08
Payer: COMMERCIAL

## 2022-07-08 ENCOUNTER — OFFICE VISIT (OUTPATIENT)
Dept: UROGYNECOLOGY | Facility: CLINIC | Age: 47
End: 2022-07-08
Payer: COMMERCIAL

## 2022-07-08 VITALS — BODY MASS INDEX: 43.4 KG/M2 | WEIGHT: 293 LBS | HEIGHT: 69 IN

## 2022-07-08 DIAGNOSIS — N94.810 VULVAR VESTIBULITIS: Primary | ICD-10-CM

## 2022-07-08 PROCEDURE — 99999 PR PBB SHADOW E&M-EST. PATIENT-LVL III: CPT | Mod: PBBFAC,,, | Performed by: OBSTETRICS & GYNECOLOGY

## 2022-07-08 PROCEDURE — 99214 OFFICE O/P EST MOD 30 MIN: CPT | Mod: S$GLB,,, | Performed by: OBSTETRICS & GYNECOLOGY

## 2022-07-08 PROCEDURE — 3008F BODY MASS INDEX DOCD: CPT | Mod: CPTII,S$GLB,, | Performed by: OBSTETRICS & GYNECOLOGY

## 2022-07-08 PROCEDURE — 1159F MED LIST DOCD IN RCRD: CPT | Mod: CPTII,S$GLB,, | Performed by: OBSTETRICS & GYNECOLOGY

## 2022-07-08 PROCEDURE — 3008F PR BODY MASS INDEX (BMI) DOCUMENTED: ICD-10-PCS | Mod: CPTII,S$GLB,, | Performed by: OBSTETRICS & GYNECOLOGY

## 2022-07-08 PROCEDURE — 99214 PR OFFICE/OUTPT VISIT, EST, LEVL IV, 30-39 MIN: ICD-10-PCS | Mod: S$GLB,,, | Performed by: OBSTETRICS & GYNECOLOGY

## 2022-07-08 PROCEDURE — 99999 PR PBB SHADOW E&M-EST. PATIENT-LVL III: ICD-10-PCS | Mod: PBBFAC,,, | Performed by: OBSTETRICS & GYNECOLOGY

## 2022-07-08 PROCEDURE — 1159F PR MEDICATION LIST DOCUMENTED IN MEDICAL RECORD: ICD-10-PCS | Mod: CPTII,S$GLB,, | Performed by: OBSTETRICS & GYNECOLOGY

## 2022-07-08 NOTE — TELEPHONE ENCOUNTER
----- Message from Feliciano Montes MD sent at 7/8/2022  8:59 AM CDT -----  Please reach out to this kind patient.  [facility] I would like to carryv outes a righttibule resection.  Please coordinate a date for her greatly appreciated thank you

## 2022-07-08 NOTE — PROGRESS NOTES
Subjective:      Patient ID: Belinda Schultz is a 47 y.o. female.    Chief Complaint:  No chief complaint on file.      History of Present Illness  Patient returns complaining of same issue discomfort in this 1 pinpoint area.  Please see especially diagram from     Its got it has progressed to the point where there is a tenderness at times outside of coital          No past medical history on file.    Past Surgical History:   Procedure Laterality Date     SECTION, CLASSIC      HYSTERECTOMY         GYN & OB History  No LMP recorded. Patient has had a hysterectomy.   Date of Last Pap: No result found    OB History    Para Term  AB Living   2 2 2     2   SAB IAB Ectopic Multiple Live Births           2      # Outcome Date GA Lbr Kang/2nd Weight Sex Delivery Anes PTL Lv   2 Term  40w0d   F CS-LTranv   KAREN   1 Term  40w0d   F CS-LTranv   KAREN       Health Maintenance       Date Due Completion Date    Hepatitis C Screening Never done ---    HIV Screening Never done ---    TETANUS VACCINE Never done ---    Mammogram Never done ---    COVID-19 Vaccine (3 - Booster for Pfizer series) 03/10/2022 10/10/2021    Influenza Vaccine (1) 2022 ---    Colorectal Cancer Screening 2024    Lipid Panel 2025          Family History   Problem Relation Age of Onset    Cancer Mother        Social History     Socioeconomic History    Marital status:    Tobacco Use    Smoking status: Never Smoker    Smokeless tobacco: Never Used   Substance and Sexual Activity    Alcohol use: Yes     Comment: 1 d rink yesterday    Drug use: No    Sexual activity: Yes     Partners: Male     Birth control/protection: None       Review of Systems  Review of Systems   Gastrointestinal: Abdominal distention: Area of pain in vagina.   Genitourinary: Positive for vaginal pain.          Objective:   There were no vitals taken for this visit.    Physical Exam   To the point  it is the exact same area as diagram from January 30, 2020 I have mapped the entire area in the vicinity of it no tenderness noted this is pinpoint and I could actually feel change of consistency in that 1 area  Assessment:     1. Vestibulitis of ear, right            Plan:     1. Vestibulitis of ear, right      I had the pleasure of knowing this patient for approach to the attracted period of time.  We have been working conservatively to manage this.  It is now come to the point that the only resolution will be surgical intervention.  Patient is well aware our chances of success are at 50%.  Our chances of making the situation worse or refractory are 50%.  Patient accepts this.  We will do a highly conservative highly accurate highly precise excision of that 1 area I will close the area with absorbable suture small a diameter.    There are no Patient Instructions on file for this visit.

## 2022-07-11 ENCOUNTER — TELEPHONE (OUTPATIENT)
Dept: UROGYNECOLOGY | Facility: CLINIC | Age: 47
End: 2022-07-11
Payer: COMMERCIAL

## 2022-07-11 DIAGNOSIS — N94.810 VULVAR VESTIBULITIS: Primary | ICD-10-CM

## 2022-07-11 NOTE — TELEPHONE ENCOUNTER
----- Message from Chey Carr MA sent at 7/11/2022  1:07 PM CDT -----  Contact: Pt    ----- Message -----  From: Urban Barlow  Sent: 7/11/2022  12:40 PM CDT  To: Simon Wiggins Staff    Type:  Patient Returning Call    Who Called: Pt  Who Left Message for Patient:  Kathe  Does the patient know what this is regarding?:  Yes  Best Call Back Number:  [phone]'    Additional Information:  Pt was calling to schedule there procedure pt stated to please call when available Thank you  Please Advise- Thank you

## 2022-07-11 NOTE — TELEPHONE ENCOUNTER
Spoke to tp and she would like to do the surgery on 9/14/22. Patient states that she had already signed consent , but do not see in  computer. Please advise?

## 2022-07-14 PROBLEM — E66.01 MORBID OBESITY WITH BMI OF 45.0-49.9, ADULT: Status: ACTIVE | Noted: 2022-07-14

## 2022-07-14 NOTE — ASSESSMENT & PLAN NOTE
Yearly episodes for the last 4 years with cough and sputum production last several months. PFTs w/ mild restriction and mild reduction in DLCO which is likely 2/2 obesity.     - Start an ICS/LABA  - LROENZO  - Medrol dose pack  - Encouraged regular, progressive exercise

## 2022-09-06 ENCOUNTER — OFFICE VISIT (OUTPATIENT)
Dept: UROGYNECOLOGY | Facility: CLINIC | Age: 47
End: 2022-09-06
Payer: COMMERCIAL

## 2022-09-06 VITALS
BODY MASS INDEX: 43.4 KG/M2 | HEIGHT: 69 IN | DIASTOLIC BLOOD PRESSURE: 70 MMHG | WEIGHT: 293 LBS | SYSTOLIC BLOOD PRESSURE: 118 MMHG

## 2022-09-06 DIAGNOSIS — Z12.4 ENCOUNTER FOR SCREENING FOR CERVICAL CANCER: ICD-10-CM

## 2022-09-06 DIAGNOSIS — R35.1 NOCTURIA: ICD-10-CM

## 2022-09-06 DIAGNOSIS — M54.50 RIGHT-SIDED LOW BACK PAIN WITHOUT SCIATICA, UNSPECIFIED CHRONICITY: ICD-10-CM

## 2022-09-06 DIAGNOSIS — R10.31 RIGHT LOWER QUADRANT PAIN: ICD-10-CM

## 2022-09-06 DIAGNOSIS — N39.41 URGE INCONTINENCE: ICD-10-CM

## 2022-09-06 DIAGNOSIS — R10.9 RIGHT FLANK PAIN: Primary | ICD-10-CM

## 2022-09-06 DIAGNOSIS — Z12.31 ENCOUNTER FOR SCREENING MAMMOGRAM FOR BREAST CANCER: ICD-10-CM

## 2022-09-06 PROCEDURE — 51701 PR INSERTION OF NON-INDWELLING BLADDER CATHETERIZATION FOR RESIDUAL UR: ICD-10-PCS | Mod: S$GLB,,, | Performed by: NURSE PRACTITIONER

## 2022-09-06 PROCEDURE — 1160F PR REVIEW ALL MEDS BY PRESCRIBER/CLIN PHARMACIST DOCUMENTED: ICD-10-PCS | Mod: CPTII,S$GLB,, | Performed by: OBSTETRICS & GYNECOLOGY

## 2022-09-06 PROCEDURE — 3074F SYST BP LT 130 MM HG: CPT | Mod: CPTII,S$GLB,, | Performed by: OBSTETRICS & GYNECOLOGY

## 2022-09-06 PROCEDURE — 1159F MED LIST DOCD IN RCRD: CPT | Mod: CPTII,S$GLB,, | Performed by: OBSTETRICS & GYNECOLOGY

## 2022-09-06 PROCEDURE — 99999 PR PBB SHADOW E&M-EST. PATIENT-LVL V: CPT | Mod: PBBFAC,,, | Performed by: OBSTETRICS & GYNECOLOGY

## 2022-09-06 PROCEDURE — 3008F BODY MASS INDEX DOCD: CPT | Mod: CPTII,S$GLB,, | Performed by: OBSTETRICS & GYNECOLOGY

## 2022-09-06 PROCEDURE — 88175 CYTOPATH C/V AUTO FLUID REDO: CPT | Performed by: NURSE PRACTITIONER

## 2022-09-06 PROCEDURE — 3078F PR MOST RECENT DIASTOLIC BLOOD PRESSURE < 80 MM HG: ICD-10-PCS | Mod: CPTII,S$GLB,, | Performed by: OBSTETRICS & GYNECOLOGY

## 2022-09-06 PROCEDURE — 87624 HPV HI-RISK TYP POOLED RSLT: CPT | Performed by: NURSE PRACTITIONER

## 2022-09-06 PROCEDURE — 3074F PR MOST RECENT SYSTOLIC BLOOD PRESSURE < 130 MM HG: ICD-10-PCS | Mod: CPTII,S$GLB,, | Performed by: OBSTETRICS & GYNECOLOGY

## 2022-09-06 PROCEDURE — 3078F DIAST BP <80 MM HG: CPT | Mod: CPTII,S$GLB,, | Performed by: OBSTETRICS & GYNECOLOGY

## 2022-09-06 PROCEDURE — 1160F RVW MEDS BY RX/DR IN RCRD: CPT | Mod: CPTII,S$GLB,, | Performed by: OBSTETRICS & GYNECOLOGY

## 2022-09-06 PROCEDURE — 99214 PR OFFICE/OUTPT VISIT, EST, LEVL IV, 30-39 MIN: ICD-10-PCS | Mod: 25,S$GLB,, | Performed by: OBSTETRICS & GYNECOLOGY

## 2022-09-06 PROCEDURE — 87086 URINE CULTURE/COLONY COUNT: CPT | Performed by: NURSE PRACTITIONER

## 2022-09-06 PROCEDURE — 3008F PR BODY MASS INDEX (BMI) DOCUMENTED: ICD-10-PCS | Mod: CPTII,S$GLB,, | Performed by: OBSTETRICS & GYNECOLOGY

## 2022-09-06 PROCEDURE — 99214 OFFICE O/P EST MOD 30 MIN: CPT | Mod: 25,S$GLB,, | Performed by: OBSTETRICS & GYNECOLOGY

## 2022-09-06 PROCEDURE — 99999 PR PBB SHADOW E&M-EST. PATIENT-LVL V: ICD-10-PCS | Mod: PBBFAC,,, | Performed by: OBSTETRICS & GYNECOLOGY

## 2022-09-06 PROCEDURE — 1159F PR MEDICATION LIST DOCUMENTED IN MEDICAL RECORD: ICD-10-PCS | Mod: CPTII,S$GLB,, | Performed by: OBSTETRICS & GYNECOLOGY

## 2022-09-06 PROCEDURE — 51701 INSERT BLADDER CATHETER: CPT | Mod: S$GLB,,, | Performed by: NURSE PRACTITIONER

## 2022-09-06 NOTE — PATIENT INSTRUCTIONS
1)  urge incontinence    --Empty bladder every 3 hours.  Empty well: wait a minute, lean forward on toilet.    --Avoid dietary irritants (see sheet).  Keep diary x 3-5 days to determine your irritants.  --consider pelvic floor PT  --URGE: consider anticholinergic.   Takes 2-4 weeks to see if will have effect.  For dry mouth: get sour, sugar free lozenge or gum.    --STRESS:  Pessary vs. Sling.     2)Vaginal atrophy (dryness):  Use 1 gram of estrogen cream in vagina  twice a week after biopsy    3). Vulvar lesions  --scheduled for biopsy  --Dr. Montes will manage accordingly based on results    4)Constipation:  --hydrate well  --continue high fiber diet  --consider daily fiber supplement.  If you start daily fiber.  Take 1 tsp of fiber powder (psyllium or other sugar-free powder).  Mix in 8 oz of water.  Take x 3-5 days.  Then, increase fiber by 1 tsp every 3-5 days until stool is easy to pass.  Stop and continue at that dose.   Do not exceed 6 tsps/day.  May also use over the counter stool softener 1-2 x/day.  AVOID laxatives.      5)pap today  -- takes 2-3 weeks to come back    6) R flank pain  --likely musculoskeletal/ ? SI  --back and spine referral ordered  --unable to illicit it with pelvic exam  --pelvic ultrasound ordered (history of ovarian cyst)    7)--Nocturia (nighttime urination): stop fluids 2 hours before bed.  If have leg swelling:  Elevate feet above chest x 1 hour before bed to get excess fluid off.  Can also use support hose (knee highs).      8) RTC pending results of biopsy      _______________________________________________________    Bladder Irritants  Certain foods and drinks have been associated with worsening symptoms of urinary frequency, urgency, urge incontinence, or  bladder pain. If you suffer from any of these conditions, you may wish to try eliminating one or more of these foods from your  diet and see if your symptoms improve. If bladder symptoms are related to dietary factors,  strict adherence to a diet that  eliminates the food should bring marked relief in 10 days. Once you are feeling better, you can begin to add foods back into  your diet, one at a time. If symptoms return, you will be able to identify the irritant. As you add foods back to your diet it is  very important that you drink significant amounts of water.  Low-acid fruit substitutions include apricots, papaya, pears and watermelon. Coffee drinkers can drink Kava or other lowacid  instant drinks. Tea drinkers can substitute non-citrus herbal and sun brewed teas. Calcium carbonate co-buffered with  calcium ascorbate can be substituted for Vitamin C. Prelief is a dietary supplement that works as an acid blocker for the  bladder.  Where to get more information:   Overcoming Bladder Disorders by Debo Bueno and Lena Jackson, 1990   You Dont Have to Live with Cystitis! By Leatha Stuart, 1988  List of Common Bladder Irritants*  Alcoholic beverages  Apples and apple juice  Cantaloupe  Carbonated beverages  Chili and spicy foods  Chocolate  Citrus fruit  Coffee (including decaffeinated)  Cranberries and cranberry juice  Grapes  Guava  Milk Products: milk, cheese, cottage cheese, yogurt, ice cream  Peaches  Pineapple  Plums  Strawberries  Sugar especially artificial sweeteners, saccharin, aspartame, corn sweeteners, honey, fructose, sucrose, lactose  Tea  Tomatoes and tomato juice  Vitamin B complex  Vinegar  *Most people are not sensitive to ALL of these products; your goal is to find the foods that make YOUR  symptoms worse

## 2022-09-06 NOTE — PROGRESS NOTES
Urogyn follow up  09/06/2022  .  [facility]  [address]  [facility] [address]    Belinda Schultz  [id number]  1975      Belinda Schultz is a 47 y.o. here for a urogyn follow up for urinary frequency, nocturia, and R flank pain x past year.  She currently is being managed by Dr. Montes for vestibulitis.      Changes from last visit:  1)  UI:  (--) STEVENSON   (+) UUI  X years. Happens mostly at night.  + enuresis until age 14 (+) liner pads:1/day, usually minimum wetness -- mostly for post void dribbling.  Daytime frequency: Q 3 hours.  Nocturia: Yes: 3-10 /night. (Average is 2-3)  (--) dysuria,  (--) hematuria,  (--) frequent UTIs.  (+) complete bladder emptying. Has not tried any medication.  Took azo over the counter-- did help flank pain 1-2 days    2)  POP:  Absent. Symptoms:(--)    (--) vaginal bleeding. (--) vaginal discharge. (+) sexually active.  (+) dyspareunia--at introitus--using lidocaine gel during intercourse (--)  Vaginal dryness.  (--) vaginal estrogen use.   (Scheduled for vaginal biopsy on Wednesday 9/14)    3)  BM:  (+) constipation/straining--rarely.  (--) chronic diarrhea. (--) hematochezia.  (--) fecal incontinence.  (--) fecal smearing/urgency.  (+) incomplete evacuation.  Takes miralax once weekly.    Does not always drink enough water during the day due to adderal    4)R flank pain  -- describes it as dull throbbing (points to R lateral side radiating to R flank--occasionally down to RLQ  --has been present over one year  --last urine culture negative  --hurts to sleep on R side  --muscle relaxers dis not help from ER    5)vestibulitis  --followed by Dr. Montes  --vulvar biopsy scheduled      Renal stone ct  05/2021  FINDINGS:  Lung Bases: Clear.  Heart: Heart size is normal.  No pericardial effusion.  Liver: Normal in size and attenuation without focal hepatic lesion.  Biliary tract: No intrahepatic or extrahepatic biliary ductal dilatation.  Gallbladder: No  radiodense gallstone. No wall thickening or pericholecystic fluid.  Pancreas: Normal. No pancreatic ductal dilatation.  Spleen: Normal size without focal lesion.  Adrenals: Normal.  Kidneys and urinary collecting systems: There is a calcification adjacent to the proximal right ureter, similar in appearance to previous examinations.  There is a phlebolith along the course of the distal right ureter.  No convincing evidence of ureteral calculi.  No hydronephrosis.  Lymph nodes: None enlarged.  Stomach and bowel: The stomach is normal.  Loops of small and large bowel are normal in caliber without evidence for inflammation or obstruction.  The appendix is visualized and is normal.  Peritoneum and mesentery: No ascites or free intraperitoneal air. No abdominal fluid collection.  Vasculature: No aneurysm or significant atherosclerosis.  Urinary bladder: Normal.  Reproductive organs: The uterus is surgically absent.  There is a 4.1 cm cystic lesion in the left adnexa (series 601, image 56).  Body wall: No abnormality.  Musculoskeletal: No aggressive osseous lesion.  Impression     1. No evidence of hydronephrosis or obstructing ureteral calculus.  2. Cystic left adnexal lesion measuring 4.1 cm which can be further evaluated with pelvic ultrasound if clinically indicated.    08/2021  Retroperitoneal ultrasound  Right kidney: The right kidney measures 13 cm. No cortical thinning. No loss of corticomedullary distinction. Resistive index measures 0.73.  No mass. No renal stone. No hydronephrosis.  Left kidney: The left kidney measures 13 cm. No cortical thinning. No loss of corticomedullary distinction. Resistive index measures 0.69.  No mass. No renal stone. No hydronephrosis.  The bladder is partially distended at the time of scanning and has an unremarkable appearance.  Impression:  No significant abnormality.Right kidney: The right kidney measures 13 cm. No cortical thinning. No loss of corticomedullary distinction.  Resistive index measures 0.73.  No mass. No renal stone. No hydronephrosis.  Left kidney: The left kidney measures 13 cm. No cortical thinning. No loss of corticomedullary distinction. Resistive index measures 0.69.  No mass. No renal stone. No hydronephrosis.  The bladder is partially distended at the time of scanning and has an unremarkable appearance.  Impression:  No significant abnormality.          History reviewed. No pertinent past medical history.    Past Surgical History:   Procedure Laterality Date     SECTION, CLASSIC      HYSTERECTOMY        C/s x 2  Lap supracervical hyst--abnormal uterine bleeding    Family History   Problem Relation Age of Onset    Cancer Mother        Social History     Socioeconomic History    Marital status:    Tobacco Use    Smoking status: Never    Smokeless tobacco: Never   Substance and Sexual Activity    Alcohol use: Yes     Comment: 1 d rink yesterday    Drug use: No    Sexual activity: Yes     Partners: Male     Birth control/protection: None       Current Outpatient Medications   Medication Sig Dispense Refill    dextroamphetamine-amphetamine (ADDERALL XR) 15 MG 24 hr capsule Take by mouth one capsule every morning 30 capsule 0    diclofenac (VOLTAREN) 75 MG EC tablet Take 75 mg by mouth 2 (two) times daily.      fluticasone furoate-vilanteroL (BREO ELLIPTA) 200-25 mcg/dose DsDv diskus inhaler Inhale 1 puff into the lungs once daily. Controller 1 each 5    montelukast (SINGULAIR) 10 mg tablet Take 10 mg by mouth once daily.      albuterol (PROVENTIL/VENTOLIN HFA) 90 mcg/actuation inhaler Inhale into the lungs.      benzonatate (TESSALON) 100 MG capsule Take 100 mg by mouth 3 (three) times daily as needed.      ergocalciferol (ERGOCALCIFEROL) 50,000 unit Cap Take 50,000 Units by mouth every 7 days.       No current facility-administered medications for this visit.       Review of patient's allergies indicates:  No Known Allergies    Well woman:  Pap:2014  "normal supracervical hysterectomy  Mammo:years ago at [facility]  Colonoscopy:04/2014 Stool in the cecum.                         - Nodule in the rectum ( submucosal ). Biopsied.   Dexa:n/a    ROS:  As per HPI.      Exam  /70 (BP Location: Right arm, Patient Position: Sitting, BP Method: Large (Manual))   Ht 5' 9" (1.753 m)   Wt (!) 149.5 kg (329 lb 9.4 oz)   BMI 48.67 kg/m²   General: alert and oriented, no acute distress  Respiratory: normal respiratory effort  Abd: soft, non-tender, non-distended  Back: +TTP over R Paraspinous-- radiating to iliac crest (mimics flank pain she describes)    Pelvic  Ext. Genitalia: Agglutination of labia minora/ majora around clitoral corral, mild hypopigmentation along labia ? Vitiligo with ulcerations noted bilaterally at introitus in area she reports vestibulitis  Vagina: + mild atrophy. Normal vaginal mucosa without lesions. No discharge noted.   Non-tender bladder base without palpable mass. No TTP in levator ani or OI  Cervix: no lesions  Uterus:  absent   Adnexa: no masses palpable-- limited due to body habitus  Urethra: no masses or tenderness  Urethral meatus: no lesions, caruncle or prolapse.    PVR 20 mL    Impression  1. Right flank pain        2. Urge incontinence  Urine culture      3. Nocturia  Urine culture      4. Right-sided low back pain without sciatica, unspecified chronicity  Ambulatory referral/consult to [facility]      5. Right lower quadrant pain  US Pelvis Comp with Transvag NON-OB (xpd      6. Encounter for screening for cervical cancer  Liquid-Based Pap Smear, Screening    HPV High Risk Genotypes, PCR      7. Encounter for screening mammogram for breast cancer  Mammo Digital Screening Bilat w/ Sd    Mammo Digital Screening Bilat w/ Sd        We reviewed the above issues and discussed options for short-term versus long-term management of her problems.   Plan:   1)  urge incontinence    --Empty bladder every 3 hours.  Empty well: wait a " minute, lean forward on toilet.    --Avoid dietary irritants (see sheet).  Keep diary x 3-5 days to determine your irritants.  --consider pelvic floor PT--would treat vaginal atrophy and vulvar lesions first  --URGE: consider anticholinergic.   Takes 2-4 weeks to see if will have effect.  For dry mouth: get sour, sugar free lozenge or gum.    --STRESS:  Pessary vs. Sling.     2)Vaginal atrophy (dryness):  Use 1 gram of estrogen cream in vagina  twice a week after biopsy    3). Vulvar lesions  --scheduled for biopsy  --Dr. Montes will manage accordingly based on results    4)Constipation:  --hydrate well  --continue high fiber diet  --consider daily fiber supplement.  If you start daily fiber.  Take 1 tsp of fiber powder (psyllium or other sugar-free powder).  Mix in 8 oz of water.  Take x 3-5 days.  Then, increase fiber by 1 tsp every 3-5 days until stool is easy to pass.  Stop and continue at that dose.   Do not exceed 6 tsps/day.  May also use over the counter stool softener 1-2 x/day.  AVOID laxatives.      5)pap today  -- takes 2-3 weeks to come back    6) R flank pain  --likely musculoskeletal/ ? SI  --back and spine referral ordered  --unable to illicit it with pelvic exam  --pelvic ultrasound ordered (history of ovarian cyst)    7)--Nocturia (nighttime urination): stop fluids 2 hours before bed.  If have leg swelling:  Elevate feet above chest x 1 hour before bed to get excess fluid off.  Can also use support hose (knee highs).      8) RTC pending results of biopsy    I spent a total of 30 minutes on the day of the visit.  This includes face to face time and non-face to face time preparing to see the patient (eg, review of tests), obtaining and/or reviewing separately obtained history, documenting clinical information in the electronic or other health record, independently interpreting results and communicating results to the patient/family/caregiver, or care coordinator.     Latrice León,  FNP-BC  [facility]  Division of Female Pelvic Medicine and Reconstructive Surgery  Department of Obstetrics & Gynecology

## 2022-09-09 ENCOUNTER — ANESTHESIA EVENT (OUTPATIENT)
Dept: SURGERY | Facility: AMBULARY SURGERY CENTER | Age: 47
End: 2022-09-09
Payer: COMMERCIAL

## 2022-09-09 LAB — BACTERIA UR CULT: NO GROWTH

## 2022-09-13 NOTE — H&P
Patient ID: Belinda Schultz is a 47 y.o. female.     Chief Complaint:  No chief complaint on file.        History of Present Illness  Patient returns complaining of same issue discomfort in this 1 pinpoint area.  Please see especially diagram from      Its got it has progressed to the point where there is a tenderness at times outside of coital              No past medical history on file.           Past Surgical History:   Procedure Laterality Date     SECTION, CLASSIC        HYSTERECTOMY             GYN & OB History  No LMP recorded. Patient has had a hysterectomy.   Date of Last Pap: No result found                      OB History    Para Term  AB Living   2 2 2     2   SAB IAB Ectopic Multiple Live Births              2          # Outcome Date GA Lbr Kang/2nd Weight Sex Delivery Anes PTL Lv   2 Term  40w0d     F CS-LTranv     KAREN   1 Term  40w0d     F CS-LTranv     KAREN                Health Maintenance        Date Due Completion Date     Hepatitis C Screening Never done ---     HIV Screening Never done ---     TETANUS VACCINE Never done ---     Mammogram Never done ---     COVID-19 Vaccine (3 - Booster for Pfizer series) 03/10/2022 10/10/2021     Influenza Vaccine (1) 2022 ---     Colorectal Cancer Screening 2024     Lipid Panel 2025                   Family History   Problem Relation Age of Onset    Cancer Mother           Social History            Socioeconomic History    Marital status:    Tobacco Use    Smoking status: Never Smoker    Smokeless tobacco: Never Used   Substance and Sexual Activity    Alcohol use: Yes       Comment: 1 d rink yesterday    Drug use: No    Sexual activity: Yes       Partners: Male       Birth control/protection: None         Review of Systems  Review of Systems   Gastrointestinal: Abdominal distention: Area of pain in vagina.   Genitourinary: Positive for vaginal pain.            Objective:    There were no vitals taken for this visit.     Physical Exam   To the point it is the exact same area as diagram from January 30, 2020 I have mapped the entire area in the vicinity of it no tenderness noted this is pinpoint and I could actually feel change of consistency in that 1 area  Assessment:      1. Vestibulitis of ear, right             Plan:      1. Vestibulitis of ear, right       I had the pleasure of knowing this patient for approach to the attracted period of time.  We have been working conservatively to manage this.  It is now come to the point that the only resolution will be surgical intervention.  Patient is well aware our chances of success are at 50%.  Our chances of making the situation worse or refractory are 50%.  Patient accepts this.  We will do a highly conservative highly accurate highly precise excision of that 1 area I will close the area with absorbable suture small a diameter.

## 2022-09-14 ENCOUNTER — HOSPITAL ENCOUNTER (OUTPATIENT)
Facility: AMBULARY SURGERY CENTER | Age: 47
Discharge: HOME | End: 2022-09-14
Attending: OBSTETRICS & GYNECOLOGY | Admitting: OBSTETRICS & GYNECOLOGY
Payer: COMMERCIAL

## 2022-09-14 ENCOUNTER — ANESTHESIA (OUTPATIENT)
Dept: SURGERY | Facility: AMBULARY SURGERY CENTER | Age: 47
End: 2022-09-14
Payer: COMMERCIAL

## 2022-09-14 DIAGNOSIS — N94.810 VULVAR VESTIBULITIS: Primary | ICD-10-CM

## 2022-09-14 PROCEDURE — 88342 CHG IMMUNOCYTOCHEMISTRY: ICD-10-PCS | Mod: 26,,, | Performed by: PATHOLOGY

## 2022-09-14 PROCEDURE — 88341 IMHCHEM/IMCYTCHM EA ADD ANTB: CPT | Mod: 26,,, | Performed by: PATHOLOGY

## 2022-09-14 PROCEDURE — 57100 PR BIOPSY OF VAGINA,SIMPLE: ICD-10-PCS | Mod: ,,, | Performed by: OBSTETRICS & GYNECOLOGY

## 2022-09-14 PROCEDURE — D9220A PRA ANESTHESIA: Mod: CRNA,,, | Performed by: NURSE ANESTHETIST, CERTIFIED REGISTERED

## 2022-09-14 PROCEDURE — 57135 EXCISION VAGINAL CYST/TUMOR: CPT | Performed by: OBSTETRICS & GYNECOLOGY

## 2022-09-14 PROCEDURE — 88342 IMHCHEM/IMCYTCHM 1ST ANTB: CPT | Performed by: PATHOLOGY

## 2022-09-14 PROCEDURE — 57100 BIOPSY VAGINAL MUCOSA SIMPLE: CPT | Mod: ,,, | Performed by: OBSTETRICS & GYNECOLOGY

## 2022-09-14 PROCEDURE — D9220A PRA ANESTHESIA: ICD-10-PCS | Mod: ANES,,, | Performed by: ANESTHESIOLOGY

## 2022-09-14 PROCEDURE — 88305 TISSUE EXAM BY PATHOLOGIST: CPT | Performed by: PATHOLOGY

## 2022-09-14 PROCEDURE — D9220A PRA ANESTHESIA: ICD-10-PCS | Mod: CRNA,,, | Performed by: NURSE ANESTHETIST, CERTIFIED REGISTERED

## 2022-09-14 PROCEDURE — D9220A PRA ANESTHESIA: Mod: ANES,,, | Performed by: ANESTHESIOLOGY

## 2022-09-14 PROCEDURE — 57100 BIOPSY VAGINAL MUCOSA SIMPLE: CPT | Performed by: OBSTETRICS & GYNECOLOGY

## 2022-09-14 PROCEDURE — 88342 IMHCHEM/IMCYTCHM 1ST ANTB: CPT | Mod: 26,,, | Performed by: PATHOLOGY

## 2022-09-14 PROCEDURE — 88341 IMHCHEM/IMCYTCHM EA ADD ANTB: CPT | Performed by: PATHOLOGY

## 2022-09-14 PROCEDURE — 88305 TISSUE EXAM BY PATHOLOGIST: CPT | Mod: 26,,, | Performed by: PATHOLOGY

## 2022-09-14 PROCEDURE — 88305 TISSUE EXAM BY PATHOLOGIST: ICD-10-PCS | Mod: 26,,, | Performed by: PATHOLOGY

## 2022-09-14 PROCEDURE — 88341 PR IHC OR ICC EACH ADD'L SINGLE ANTIBODY  STAINPR: ICD-10-PCS | Mod: 26,,, | Performed by: PATHOLOGY

## 2022-09-14 RX ORDER — PROPOFOL 10 MG/ML
VIAL (ML) INTRAVENOUS
Status: DISCONTINUED | OUTPATIENT
Start: 2022-09-14 | End: 2022-09-14

## 2022-09-14 RX ORDER — FENTANYL CITRATE 50 UG/ML
25 INJECTION, SOLUTION INTRAMUSCULAR; INTRAVENOUS EVERY 5 MIN PRN
Status: DISCONTINUED | OUTPATIENT
Start: 2022-09-14 | End: 2022-09-14 | Stop reason: HOSPADM

## 2022-09-14 RX ORDER — ACETAMINOPHEN 10 MG/ML
INJECTION, SOLUTION INTRAVENOUS
Status: DISCONTINUED | OUTPATIENT
Start: 2022-09-14 | End: 2022-09-14

## 2022-09-14 RX ORDER — SODIUM CHLORIDE, SODIUM LACTATE, POTASSIUM CHLORIDE, CALCIUM CHLORIDE 600; 310; 30; 20 MG/100ML; MG/100ML; MG/100ML; MG/100ML
INJECTION, SOLUTION INTRAVENOUS CONTINUOUS
Status: DISCONTINUED | OUTPATIENT
Start: 2022-09-14 | End: 2022-09-14 | Stop reason: HOSPADM

## 2022-09-14 RX ORDER — LIDOCAINE HYDROCHLORIDE 20 MG/ML
JELLY TOPICAL
Status: DISCONTINUED
Start: 2022-09-14 | End: 2022-09-14 | Stop reason: HOSPADM

## 2022-09-14 RX ORDER — METOCLOPRAMIDE HYDROCHLORIDE 5 MG/ML
10 INJECTION INTRAMUSCULAR; INTRAVENOUS EVERY 10 MIN PRN
Status: DISCONTINUED | OUTPATIENT
Start: 2022-09-14 | End: 2022-09-14 | Stop reason: HOSPADM

## 2022-09-14 RX ORDER — OXYCODONE HYDROCHLORIDE 5 MG/1
5 TABLET ORAL
Status: DISCONTINUED | OUTPATIENT
Start: 2022-09-14 | End: 2022-09-14 | Stop reason: HOSPADM

## 2022-09-14 RX ORDER — LIDOCAINE HYDROCHLORIDE 10 MG/ML
INJECTION, SOLUTION EPIDURAL; INFILTRATION; INTRACAUDAL; PERINEURAL
Status: DISCONTINUED
Start: 2022-09-14 | End: 2022-09-14 | Stop reason: HOSPADM

## 2022-09-14 RX ORDER — EPINEPHRINE 1 MG/ML
INJECTION, SOLUTION INTRACARDIAC; INTRAMUSCULAR; INTRAVENOUS; SUBCUTANEOUS
Status: DISCONTINUED
Start: 2022-09-14 | End: 2022-09-14 | Stop reason: HOSPADM

## 2022-09-14 RX ORDER — KETOROLAC TROMETHAMINE 30 MG/ML
INJECTION, SOLUTION INTRAMUSCULAR; INTRAVENOUS
Status: DISCONTINUED | OUTPATIENT
Start: 2022-09-14 | End: 2022-09-14

## 2022-09-14 RX ORDER — LIDOCAINE HYDROCHLORIDE 10 MG/ML
1 INJECTION, SOLUTION EPIDURAL; INFILTRATION; INTRACAUDAL; PERINEURAL ONCE
Status: DISCONTINUED | OUTPATIENT
Start: 2022-09-14 | End: 2022-09-14 | Stop reason: HOSPADM

## 2022-09-14 RX ORDER — MIDAZOLAM HYDROCHLORIDE 1 MG/ML
INJECTION, SOLUTION INTRAMUSCULAR; INTRAVENOUS
Status: DISCONTINUED | OUTPATIENT
Start: 2022-09-14 | End: 2022-09-14

## 2022-09-14 RX ORDER — LIDOCAINE HYDROCHLORIDE 20 MG/ML
INJECTION INTRAVENOUS
Status: DISCONTINUED | OUTPATIENT
Start: 2022-09-14 | End: 2022-09-14

## 2022-09-14 RX ORDER — OXYCODONE AND ACETAMINOPHEN 7.5; 325 MG/1; MG/1
1 TABLET ORAL EVERY 8 HOURS PRN
Qty: 21 TABLET | Refills: 0 | Status: SHIPPED | OUTPATIENT
Start: 2022-09-14 | End: 2022-09-21

## 2022-09-14 RX ORDER — LIDOCAINE HYDROCHLORIDE AND EPINEPHRINE 10; 10 MG/ML; UG/ML
INJECTION, SOLUTION INFILTRATION; PERINEURAL
Status: DISCONTINUED | OUTPATIENT
Start: 2022-09-14 | End: 2022-09-14 | Stop reason: HOSPADM

## 2022-09-14 RX ORDER — ESTRADIOL 0.1 MG/G
CREAM VAGINAL
Status: DISCONTINUED
Start: 2022-09-14 | End: 2022-09-14 | Stop reason: HOSPADM

## 2022-09-14 RX ORDER — CEFAZOLIN SODIUM 1 G/3ML
INJECTION, POWDER, FOR SOLUTION INTRAMUSCULAR; INTRAVENOUS
Status: DISCONTINUED
Start: 2022-09-14 | End: 2022-09-14 | Stop reason: WASHOUT

## 2022-09-14 RX ORDER — DEXAMETHASONE SODIUM PHOSPHATE 4 MG/ML
INJECTION, SOLUTION INTRA-ARTICULAR; INTRALESIONAL; INTRAMUSCULAR; INTRAVENOUS; SOFT TISSUE
Status: DISCONTINUED | OUTPATIENT
Start: 2022-09-14 | End: 2022-09-14

## 2022-09-14 RX ORDER — CEFAZOLIN SODIUM 1 G/3ML
2 INJECTION, POWDER, FOR SOLUTION INTRAMUSCULAR; INTRAVENOUS ONCE
Status: DISCONTINUED | OUTPATIENT
Start: 2022-09-14 | End: 2022-09-14 | Stop reason: HOSPADM

## 2022-09-14 RX ORDER — FENTANYL CITRATE 50 UG/ML
INJECTION, SOLUTION INTRAMUSCULAR; INTRAVENOUS
Status: DISCONTINUED | OUTPATIENT
Start: 2022-09-14 | End: 2022-09-14

## 2022-09-14 RX ORDER — ONDANSETRON 2 MG/ML
INJECTION INTRAMUSCULAR; INTRAVENOUS
Status: DISCONTINUED | OUTPATIENT
Start: 2022-09-14 | End: 2022-09-14

## 2022-09-14 RX ADMIN — FENTANYL CITRATE 50 MCG: 50 INJECTION, SOLUTION INTRAMUSCULAR; INTRAVENOUS at 07:09

## 2022-09-14 RX ADMIN — Medication 150 MG: at 07:09

## 2022-09-14 RX ADMIN — SODIUM CHLORIDE, SODIUM LACTATE, POTASSIUM CHLORIDE, CALCIUM CHLORIDE: 600; 310; 30; 20 INJECTION, SOLUTION INTRAVENOUS at 06:09

## 2022-09-14 RX ADMIN — ACETAMINOPHEN 1000 MG: 10 INJECTION, SOLUTION INTRAVENOUS at 07:09

## 2022-09-14 RX ADMIN — ONDANSETRON 4 MG: 2 INJECTION INTRAMUSCULAR; INTRAVENOUS at 07:09

## 2022-09-14 RX ADMIN — ONDANSETRON 4 MG: 2 INJECTION INTRAMUSCULAR; INTRAVENOUS at 06:09

## 2022-09-14 RX ADMIN — KETOROLAC TROMETHAMINE 30 MG: 30 INJECTION, SOLUTION INTRAMUSCULAR; INTRAVENOUS at 07:09

## 2022-09-14 RX ADMIN — LIDOCAINE HYDROCHLORIDE 100 MG: 20 INJECTION INTRAVENOUS at 07:09

## 2022-09-14 RX ADMIN — Medication 50 MG: at 07:09

## 2022-09-14 RX ADMIN — DEXAMETHASONE SODIUM PHOSPHATE 4 MG: 4 INJECTION, SOLUTION INTRA-ARTICULAR; INTRALESIONAL; INTRAMUSCULAR; INTRAVENOUS; SOFT TISSUE at 07:09

## 2022-09-14 RX ADMIN — MIDAZOLAM HYDROCHLORIDE 2 MG: 1 INJECTION, SOLUTION INTRAMUSCULAR; INTRAVENOUS at 06:09

## 2022-09-14 NOTE — PLAN OF CARE
Patient is being driven home by her . She was able to walk to the bathroom and the car. Her follow up appointment will be made at home for the [facility] location.Their questions were answered.

## 2022-09-14 NOTE — OP NOTE
Operative Note       Surgery Date: 9/14/2022     Surgeon(s) and Role:     * Feliciano Montes MD - Primary    Pre-op Diagnosis:  Vulvar vestibulitis [N94.810]    Post-op Diagnosis: Post-Op Diagnosis Codes:     * Vulvar vestibulitis [N94.810]    Procedure(s) (LRB):  BIOPSY, VAGINA (Right)    Anesthesia: General    Procedure in Detail/Findings:  The patient was identified in the preoperative area where informed consent was confirmed, and she was taken to the operating room where an adequate level of general anesthesia was obtained. The patient was positioned in high lithotomy position with legs in yellow fin stirrups. Care was taken to avoid joint hyperflexion or hyperextension, and all extremity surfaces were carefully padded so as to minimize risk of neurologic injury. Intravenous antibiotics were administered preoperatively. Sequential compression devices were applied to the patient's lower extremities preoperatively VTE prophylaxis. Surgical time-out was performed, where the patient was identified and procedures confirmed. An examination under anesthesia was performed with findings described as above. The patient's abdomen, perineum, and vagina were sterilely prepped and draped. A fajardo catheter was placed in the bladder for drainage.   Exam under anesthesia revealed  normal anatomy.  The area of discomfort easily identifiable as it is somewhat lighter.  This is at about the 7 o'clock position it starts about half a cm inside introitus and goes down to posterior fourchette and a little beyond.          A lone star retractor with blue stays was used.     This allowed me to have excellent visualization of the area.  I actually marked with pinpoint markings of ink.  From there I then applied lidocaine 1% with epinephrine I then made incision with 15 blade I then utilized pickups with teeth and tenotomy scissors to remove vaginal epithelium with great attention to avoid entering into a deeper plane once this was all  carried out I then reapproximated the tissue using a 3-0 Vicryl in a running nonlocking manner excellent approximation of tissue complete symmetry achieved.  At the conclusion vaginal packing was utilized for compression to prevent hematoma.  Patient will go to recovery.  At the conclusion all counts were correct      The patient was transferred to recovery in stable condition. The vagina was packed with guaze.    Estimated Blood Loss: * No values recorded between 9/14/2022  7:14 AM and 9/14/2022  7:39 AM *           Specimens (From admission, onward)       Start     Ordered    09/14/22 0722  Specimen to Pathology, Surgery Gynecology and Obstetrics  Once        Comments: Pre-op Diagnosis: Vulvar vestibulitis [N94.810]Procedure(s):BIOPSY, VAGINA Number of specimens: 1Name of specimens: #1- Vaginal Vestibular Epithelium at 5 and 7 o'clock     References:    Click here for ordering Quick Tip   Question Answer Comment   Procedure Type: Gynecology and Obstetrics    Specimen Class: Routine/Screening    Which provider would you like to cc? TITO MONTES    Release to patient Immediate        09/14/22 0723                  Implants: * No implants in log *           Disposition: PACU - hemodynamically stable.           Condition: Good    Attestation:  I was present and scrubbed for the entire procedure.           Discharge Note    Admit Date: 9/14/2022    Attending Physician: Tito Montes MD     Discharge Physician: Tito Montes MD    Final Diagnosis: Post-Op Diagnosis Codes:     * Vulvar vestibulitis [N94.810]    Disposition: Home or Self Care    Patient Instructions:   Current Discharge Medication List        CONTINUE these medications which have NOT CHANGED    Details   dextroamphetamine-amphetamine (ADDERALL XR) 15 MG 24 hr capsule Take by mouth one capsule every morning  Qty: 30 capsule, Refills: 0      diclofenac (VOLTAREN) 75 MG EC tablet Take 75 mg by mouth 2 (two) times daily.      ergocalciferol  (ERGOCALCIFEROL) 50,000 unit Cap Take 50,000 Units by mouth every 7 days.             Discharge Procedure Orders (must include Diet, Follow-up, Activity)  No discharge procedures on file.     Discharge Date: No discharge date for patient encounter.

## 2022-09-14 NOTE — DISCHARGE SUMMARY
[facility]  Discharge Note  Short Stay    Procedure(s) (LRB):  BIOPSY, VAGINA (Right)    OUTCOME: Patient tolerated treatment/procedure well without complication and is now ready for discharge.    DISPOSITION: Home or Self Care    FINAL DIAGNOSIS:  <principal problem not specified>    FOLLOWUP: In clinic    DISCHARGE INSTRUCTIONS:    Discharge Procedure Orders   Diet Adult Regular     Pelvic Rest     Lifting restrictions     Other restrictions (specify):     Notify your health care provider if you experience any of the following:  temperature >100.4     Notify your health care provider if you experience any of the following:  persistent nausea and vomiting or diarrhea     Notify your health care provider if you experience any of the following:  severe uncontrolled pain     Notify your health care provider if you experience any of the following:  redness, tenderness, or signs of infection (pain, swelling, redness, odor or green/yellow discharge around incision site)     Notify your health care provider if you experience any of the following:  difficulty breathing or increased cough     Notify your health care provider if you experience any of the following:  severe persistent headache     Notify your health care provider if you experience any of the following:  worsening rash     Notify your health care provider if you experience any of the following:  persistent dizziness, light-headedness, or visual disturbances     Notify your health care provider if you experience any of the following:  increased confusion or weakness     Notify your health care provider if you experience any of the following:         Clinical Reference Documents Added to Patient Instructions         Document    MANAGING PAIN AFTER SURGERY (ENGLISH)            TIME SPENT ON DISCHARGE: 20   minutes

## 2022-09-14 NOTE — ANESTHESIA PROCEDURE NOTES
Intubation    Date/Time: 9/14/2022 7:02 AM  Performed by: Debo Gilmore CRNA  Authorized by: Aj Rizvi MD     Intubation:     Induction:  Intravenous    Intubated:  Postinduction    Mask Ventilation:  Easy mask    Attempts:  1    Attempted By:  CRNA    Method of Intubation:  Direct    Difficult Airway Encountered?: No      Complications:  None    Airway Device:  Supraglottic airway/LMA    Airway Device Size:  4.0    Style/Cuff Inflation:  Cuffed (inflated to minimal occlusive pressure)    Secured at:  The lips    Placement Verified By:  Capnometry    Complicating Factors:  None    Findings Post-Intubation:  BS equal bilateral and atraumatic/condition of teeth unchanged

## 2022-09-14 NOTE — TRANSFER OF CARE
Anesthesia Transfer of Care Note    Patient: Belinda Schultz    Procedure(s) Performed: Procedure(s) (LRB):  BIOPSY, VAGINA (Right)    Patient location: PACU    Anesthesia Type: general    Transport from OR: Transported from OR on room air with adequate spontaneous ventilation    Post pain: adequate analgesia    Post assessment: no apparent anesthetic complications and tolerated procedure well    Post vital signs: stable    Level of consciousness: sedated and responds to stimulation    Nausea/Vomiting: no nausea/vomiting    Complications: none    Transfer of care protocol was followed      Last vitals:   Visit Vitals  BP (!) 184/78   Pulse 72   Temp 36.4 °C (97.5 °F)   Resp 18   Wt (!) 149.5 kg (329 lb 9.4 oz)   SpO2 100%   Breastfeeding No   BMI 48.67 kg/m²

## 2022-09-14 NOTE — ANESTHESIA POSTPROCEDURE EVALUATION
Anesthesia Post Evaluation    Patient: Belinda Schultz    Procedure(s) Performed: Procedure(s) (LRB):  BIOPSY, VAGINA (Right)    Final Anesthesia Type: general      Patient location during evaluation: PACU  Patient participation: Yes- Able to Participate  Level of consciousness: awake and alert and oriented  Post-procedure vital signs: reviewed and stable  Pain management: adequate  Airway patency: patent    PONV status at discharge: No PONV  Anesthetic complications: no      Cardiovascular status: blood pressure returned to baseline and stable  Respiratory status: unassisted and spontaneous ventilation  Hydration status: euvolemic  Follow-up not needed.          Vitals Value Taken Time   /75 09/14/22 0849   Temp 36.5 °C (97.7 °F) 09/14/22 0745   Pulse 66 09/14/22 0851   Resp 18 09/14/22 0840   SpO2 93 % 09/14/22 0851   Vitals shown include unvalidated device data.      Event Time   Out of Recovery 08:59:00         Pain/Adriana Score: Adriana Score: 10 (9/14/2022  8:58 AM)

## 2022-09-14 NOTE — ANESTHESIA PREPROCEDURE EVALUATION
09/14/2022  Belinda Schultz is a 47 y.o., female.      Pre-op Assessment    I have reviewed the Patient Summary Reports.     I have reviewed the Nursing Notes. I have reviewed the NPO Status.   I have reviewed the Medications.     Review of Systems  Anesthesia Hx:  No problems with previous Anesthesia    Social:  Non-Smoker    Cardiovascular:  Cardiovascular Normal     Pulmonary:   COPD (chronic bronchitis - asymptomatic today), mild    Renal/:  Renal/ Normal     Neurological:  Neurology Normal    Endocrine:  Endocrine Normal  Obesity / BMI > 30, Morbid Obesity / BMI > 40  Psych:   Psychiatric History (ADHD)          Physical Exam  General: Well nourished, Cooperative, Alert and Oriented    Airway:  Mallampati: II / II  Mouth Opening: Normal  Neck ROM: Normal ROM        Anesthesia Plan  Type of Anesthesia, risks & benefits discussed:    Anesthesia Type: Gen ETT, Gen Supraglottic Airway, Gen Natural Airway, MAC  Intra-op Monitoring Plan: Standard ASA Monitors  Post Op Pain Control Plan: multimodal analgesia  Induction:  IV  Airway Plan: Direct, Video and Fiberoptic, Post-Induction  Informed Consent: Informed consent signed with the Patient and all parties understand the risks and agree with anesthesia plan.  All questions answered.   ASA Score: 3    Ready For Surgery From Anesthesia Perspective.     .

## 2022-09-16 VITALS
DIASTOLIC BLOOD PRESSURE: 67 MMHG | TEMPERATURE: 98 F | WEIGHT: 293 LBS | SYSTOLIC BLOOD PRESSURE: 147 MMHG | RESPIRATION RATE: 18 BRPM | OXYGEN SATURATION: 97 % | BODY MASS INDEX: 48.67 KG/M2 | HEART RATE: 71 BPM

## 2022-09-19 LAB
FINAL PATHOLOGIC DIAGNOSIS: NORMAL
GROSS: NORMAL
Lab: NORMAL

## 2022-11-04 ENCOUNTER — OFFICE VISIT (OUTPATIENT)
Dept: UROGYNECOLOGY | Facility: CLINIC | Age: 47
End: 2022-11-04
Payer: COMMERCIAL

## 2022-11-04 VITALS
BODY MASS INDEX: 43.4 KG/M2 | WEIGHT: 293 LBS | SYSTOLIC BLOOD PRESSURE: 128 MMHG | DIASTOLIC BLOOD PRESSURE: 68 MMHG | HEIGHT: 69 IN

## 2022-11-04 DIAGNOSIS — Z09 POSTOP CHECK: Primary | ICD-10-CM

## 2022-11-04 PROCEDURE — 1159F MED LIST DOCD IN RCRD: CPT | Mod: CPTII,S$GLB,, | Performed by: OBSTETRICS & GYNECOLOGY

## 2022-11-04 PROCEDURE — 1159F PR MEDICATION LIST DOCUMENTED IN MEDICAL RECORD: ICD-10-PCS | Mod: CPTII,S$GLB,, | Performed by: OBSTETRICS & GYNECOLOGY

## 2022-11-04 PROCEDURE — 99999 PR PBB SHADOW E&M-EST. PATIENT-LVL II: ICD-10-PCS | Mod: PBBFAC,,, | Performed by: OBSTETRICS & GYNECOLOGY

## 2022-11-04 PROCEDURE — 99024 PR POST-OP FOLLOW-UP VISIT: ICD-10-PCS | Mod: S$GLB,,, | Performed by: OBSTETRICS & GYNECOLOGY

## 2022-11-04 PROCEDURE — 99024 POSTOP FOLLOW-UP VISIT: CPT | Mod: S$GLB,,, | Performed by: OBSTETRICS & GYNECOLOGY

## 2022-11-04 PROCEDURE — 3008F PR BODY MASS INDEX (BMI) DOCUMENTED: ICD-10-PCS | Mod: CPTII,S$GLB,, | Performed by: OBSTETRICS & GYNECOLOGY

## 2022-11-04 PROCEDURE — 99999 PR PBB SHADOW E&M-EST. PATIENT-LVL II: CPT | Mod: PBBFAC,,, | Performed by: OBSTETRICS & GYNECOLOGY

## 2022-11-04 PROCEDURE — 3008F BODY MASS INDEX DOCD: CPT | Mod: CPTII,S$GLB,, | Performed by: OBSTETRICS & GYNECOLOGY

## 2022-11-04 NOTE — PROGRESS NOTES
"Subjective:      Patient ID: Belinda Schultz is a 47 y.o. female.    Chief Complaint:  Post-op Evaluation      History of Present Illness  Seven weeks postop doing well no issues no complaints          Past Medical History:   Diagnosis Date    ADHD     Cyst of Bartholin's gland     Inflammation of joint of right knee     Scar tissue     vaginal    Unspecified chronic bronchitis        Past Surgical History:   Procedure Laterality Date    bartholin gland removal      BIOPSY OF VAGINA Right 2022    Procedure: BIOPSY, VAGINA;  Surgeon: Feliciano Montes MD;  Location: [facility];  Service: OB/GYN;  Laterality: Right;     SECTION, CLASSIC      HYSTERECTOMY         GYN & OB History  No LMP recorded. Patient has had a hysterectomy.   Date of Last Pap: No result found    OB History    Para Term  AB Living   2 2 2     2   SAB IAB Ectopic Multiple Live Births           2      # Outcome Date GA Lbr Kang/2nd Weight Sex Delivery Anes PTL Lv   2 Term  40w0d   F CS-LTranv   KAREN   1 Term  40w0d   F CS-LTranv   KAREN       Health Maintenance         Date Due Completion Date    Hepatitis C Screening Never done ---    HIV Screening Never done ---    TETANUS VACCINE Never done ---    Mammogram Never done ---    COVID-19 Vaccine (3 - Booster for Pfizer series) 2021 10/10/2021    Influenza Vaccine (1) Never done ---    Colorectal Cancer Screening 2024    Lipid Panel 2025            Family History   Problem Relation Age of Onset    Cancer Mother        Social History     Socioeconomic History    Marital status:    Tobacco Use    Smoking status: Never    Smokeless tobacco: Never   Substance and Sexual Activity    Alcohol use: Yes     Comment: social occassional    Drug use: No    Sexual activity: Yes     Partners: Male     Birth control/protection: None       Review of Systems  Review of Systems  Postoperative she checked no complaints     Objective:   Ht " " (1.753 m)   BMI 48.65 kg/m²     Physical Exam   Healing well no issues no complaints palpation to area of biopsy and removal of tissue no tenderness  Assessment:     1. Postop check            Plan:     1. Postop check      Patient doing very very well no issues no complaints    Patient released from clinic patient return for any further need  There are no Patient Instructions on file for this visit.

## 2022-11-25 ENCOUNTER — HOSPITAL ENCOUNTER (EMERGENCY)
Facility: HOSPITAL | Age: 47
Discharge: HOME | End: 2022-11-25
Attending: EMERGENCY MEDICINE
Payer: COMMERCIAL

## 2022-11-25 VITALS
OXYGEN SATURATION: 97 % | DIASTOLIC BLOOD PRESSURE: 69 MMHG | HEART RATE: 72 BPM | BODY MASS INDEX: 43.4 KG/M2 | HEIGHT: 69 IN | SYSTOLIC BLOOD PRESSURE: 137 MMHG | WEIGHT: 293 LBS | RESPIRATION RATE: 20 BRPM | TEMPERATURE: 99 F

## 2022-11-25 DIAGNOSIS — K08.89 PAIN, DENTAL: Primary | ICD-10-CM

## 2022-11-25 LAB
ANION GAP SERPL CALC-SCNC: 10 MMOL/L (ref 8–16)
BASOPHILS # BLD AUTO: 0.02 K/UL (ref 0–0.2)
BASOPHILS NFR BLD: 0.3 % (ref 0–1.9)
BUN SERPL-MCNC: 10 MG/DL (ref 6–20)
CALCIUM SERPL-MCNC: 9.4 MG/DL (ref 8.7–10.5)
CHLORIDE SERPL-SCNC: 102 MMOL/L (ref 95–110)
CO2 SERPL-SCNC: 26 MMOL/L (ref 23–29)
CREAT SERPL-MCNC: 0.8 MG/DL (ref 0.5–1.4)
DIFFERENTIAL METHOD: NORMAL
EOSINOPHIL # BLD AUTO: 0.1 K/UL (ref 0–0.5)
EOSINOPHIL NFR BLD: 1 % (ref 0–8)
ERYTHROCYTE [DISTWIDTH] IN BLOOD BY AUTOMATED COUNT: 13.2 % (ref 11.5–14.5)
EST. GFR  (NO RACE VARIABLE): >60 ML/MIN/1.73 M^2
GLUCOSE SERPL-MCNC: 95 MG/DL (ref 70–110)
HCT VFR BLD AUTO: 39.1 % (ref 37–48.5)
HGB BLD-MCNC: 12.6 G/DL (ref 12–16)
IMM GRANULOCYTES # BLD AUTO: 0.02 K/UL (ref 0–0.04)
IMM GRANULOCYTES NFR BLD AUTO: 0.3 % (ref 0–0.5)
LYMPHOCYTES # BLD AUTO: 2.8 K/UL (ref 1–4.8)
LYMPHOCYTES NFR BLD: 38.7 % (ref 18–48)
MCH RBC QN AUTO: 29.3 PG (ref 27–31)
MCHC RBC AUTO-ENTMCNC: 32.2 G/DL (ref 32–36)
MCV RBC AUTO: 91 FL (ref 82–98)
MONOCYTES # BLD AUTO: 0.6 K/UL (ref 0.3–1)
MONOCYTES NFR BLD: 7.9 % (ref 4–15)
NEUTROPHILS # BLD AUTO: 3.7 K/UL (ref 1.8–7.7)
NEUTROPHILS NFR BLD: 51.8 % (ref 38–73)
NRBC BLD-RTO: 0 /100 WBC
PLATELET # BLD AUTO: 307 K/UL (ref 150–450)
PMV BLD AUTO: 9.3 FL (ref 9.2–12.9)
POTASSIUM SERPL-SCNC: 3.8 MMOL/L (ref 3.5–5.1)
RBC # BLD AUTO: 4.3 M/UL (ref 4–5.4)
SODIUM SERPL-SCNC: 138 MMOL/L (ref 136–145)
WBC # BLD AUTO: 7.11 K/UL (ref 3.9–12.7)

## 2022-11-25 PROCEDURE — 99285 EMERGENCY DEPT VISIT HI MDM: CPT | Mod: 25

## 2022-11-25 PROCEDURE — 96374 THER/PROPH/DIAG INJ IV PUSH: CPT | Mod: 59

## 2022-11-25 PROCEDURE — 85025 COMPLETE CBC W/AUTO DIFF WBC: CPT | Performed by: EMERGENCY MEDICINE

## 2022-11-25 PROCEDURE — 25500020 PHARM REV CODE 255: Performed by: EMERGENCY MEDICINE

## 2022-11-25 PROCEDURE — 80048 BASIC METABOLIC PNL TOTAL CA: CPT | Performed by: EMERGENCY MEDICINE

## 2022-11-25 PROCEDURE — 63600175 PHARM REV CODE 636 W HCPCS: Performed by: EMERGENCY MEDICINE

## 2022-11-25 RX ORDER — KETOROLAC TROMETHAMINE 30 MG/ML
15 INJECTION, SOLUTION INTRAMUSCULAR; INTRAVENOUS
Status: COMPLETED | OUTPATIENT
Start: 2022-11-25 | End: 2022-11-25

## 2022-11-25 RX ORDER — KETOROLAC TROMETHAMINE 10 MG/1
10 TABLET, FILM COATED ORAL EVERY 6 HOURS PRN
Qty: 20 TABLET | Refills: 0 | Status: SHIPPED | OUTPATIENT
Start: 2022-11-25 | End: 2022-11-28

## 2022-11-25 RX ADMIN — IOHEXOL 75 ML: 350 INJECTION, SOLUTION INTRAVENOUS at 09:11

## 2022-11-25 RX ADMIN — KETOROLAC TROMETHAMINE 15 MG: 30 INJECTION, SOLUTION INTRAMUSCULAR at 07:11

## 2022-11-25 NOTE — DISCHARGE INSTRUCTIONS
Dear Ms. Schultz,     Thank you for choosing [facility]! We appreciate you coming to us for your medical care. We hope you feel better soon! Please come back to [facility] for all of your future medical needs.    Our goal in the emergency department is to always give you outstanding care and exceptional service. That being said, you may receive a survey by mail or e-mail in the next week regarding your experience in our ED. We would greatly appreciate your completing and returning the survey. Your feedback provides us with a way to recognize our staff who give very good care and it helps us learn how to improve when your experience was below our aspiration of excellence.        Sincerely Yours,          Refugio Sepulveda MD  Board Certified Emergency Medicine Physician   Emergency Department  [facility]

## 2022-11-25 NOTE — ED PROVIDER NOTES
"Encounter Date: 2022    SCRIBE #1 NOTE: I, Tundepura Sauceda Jr, am scribing for, and in the presence of,  Refugio Sepulveda MD. I have scribed the following portions of the note - Other sections scribed: HPI, ROS, PE, MDM.     History     Chief Complaint   Patient presents with    Dental Pain     Lt. Lower tooth pain x3 days. Pt. Is on antibiotics x2 days and pain medication. Has pending root canal after completion of antibiotics. Reports worsening pain to face and neck. Some relief from ice. Lst took pain medication at 2am.        Patient is a 46 yo AAF who presents to the ED with the complaint of L lower rear molar pain. Pt is scheduled for a root canal next week, but she reports worsening tooth pain over the past 3 days with pain and subjective swelling to the L side of her neck and "going under my chin" x 1 day. She denies any fever, change in voice, trismus, difficulty tolerating her secretions. She denies any improvement of her pain with her prescribed Norco and ibuprofen. She denies any recent dental manipulation or extraction when explicitly questioned.     The history is provided by the patient. No  was used.   Review of patient's allergies indicates:  No Known Allergies  Past Medical History:   Diagnosis Date    ADHD     Cyst of Bartholin's gland     Inflammation of joint of right knee     Scar tissue     vaginal    Unspecified chronic bronchitis      Past Surgical History:   Procedure Laterality Date    bartholin gland removal      BIOPSY OF VAGINA Right 2022    Procedure: BIOPSY, VAGINA;  Surgeon: Feliciano Montes MD;  Location: [facility] OR;  Service: OB/GYN;  Laterality: Right;     SECTION, CLASSIC      HYSTERECTOMY       Family History   Problem Relation Age of Onset    Cancer Mother      Social History     Tobacco Use    Smoking status: Never    Smokeless tobacco: Never   Substance Use Topics    Alcohol use: Yes     Comment: social occassional    Drug use: No " "    Review of Systems   Constitutional:  Negative for fever.   HENT:  Positive for dental problem. Negative for sore throat.         Positive facial pain.   Respiratory:  Negative for shortness of breath.    Cardiovascular:  Negative for chest pain.   Gastrointestinal:  Negative for nausea.   Genitourinary:  Negative for dysuria.   Musculoskeletal:  Positive for neck pain. Negative for back pain and neck stiffness.   Skin:  Negative for rash.   Neurological:  Negative for weakness.   Hematological:  Does not bruise/bleed easily.     Physical Exam     Initial Vitals [11/25/22 0703]   BP Pulse Resp Temp SpO2   (!) 194/92 89 20 98.9 °F (37.2 °C) 98 %      MAP       --         Physical Exam    Nursing note and vitals reviewed.  Constitutional: She appears well-developed and well-nourished. No distress.   Well-appearing   Non toxic   No acute distress    HENT:   Head: Normocephalic and atraumatic.   Gingival swelling noted to the left rear molar. No abscess noted on exam.   no abnormal tongue elevation on oropharyngeal exam; the uvula is midline; no trismus     Eyes: Conjunctivae and EOM are normal.   Neck: Neck supple.   Tenderness noted to the area of the L cervical change; no so-called "woody" or "brawny" feel to the submandibular space   Normal range of motion.  Cardiovascular:  Normal rate, regular rhythm and normal heart sounds.           Pulmonary/Chest: Breath sounds normal.   Abdominal: Abdomen is soft. There is no abdominal tenderness.   Musculoskeletal:      Cervical back: Normal range of motion and neck supple.     Neurological: She is alert and oriented to person, place, and time.   Skin: Skin is warm and dry. No rash noted.   Psychiatric: Thought content normal.       ED Course   Procedures  Labs Reviewed   CBC W/ AUTO DIFFERENTIAL   BASIC METABOLIC PANEL          Imaging Results              CT Soft Tissue Neck With Contrast (Final result)  Result time 11/25/22 09:45:24      Final result by Lazaro BEAL" MD Kate (11/25/22 09:45:24)                   Impression:      No drainable abscess identified in the neck soft tissues.    Mildly enlarged cervical chain lymph nodes, likely reactive.      Electronically signed by: Lazaro Love MD  Date:    11/25/2022  Time:    09:45               Narrative:    EXAMINATION:  CT SOFT TISSUE NECK WITH CONTRAST    CLINICAL HISTORY:  Neck abscess, deep tissue;    TECHNIQUE:  CT images obtained throughout the region of the neck after the administration of 75 mL Omnipaque 350 intravenous contrast. Axial, sagittal and coronal reconstructions were performed.    COMPARISON:  None.    FINDINGS:  Mildly enlarged cervical chain lymph nodes, measuring up to 1 cm in short axis, likely reactive.  No focal soft tissue mass.  No organized fluid collection in the neck soft tissues.    The soft tissues of the nasopharynx, oropharynx, hypopharynx and larynx are negative for acute finding.  The epiglottis is normal.  The parotid, submandibular, and thyroid glands are within normal limits.    Major vessels of the neck appear patent. Limited intracranial evaluation is within normal limits.  The visualized paranasal sinuses and mastoid air cells are essentially clear.    Lung apices are clear.    Bones demonstrate no aggressive osseous lesion.                                       Medications   ketorolac injection 15 mg (15 mg Intravenous Given 11/25/22 0743)   iohexoL (OMNIPAQUE 350) injection 100 mL (75 mLs Intravenous Given 11/25/22 0911)     Medical Decision Making:   History:   Old Medical Records: I decided to obtain old medical records.  Initial Assessment:   Belinda Schultz is a 47 y.o. female who has a past medical history of ADHD, cyst of bartholin's gland, inflammation of joint of right knee, scar tissue, and unspecified chronic bronchitis.The patient presents to the emergency department for evaluation of dental pain; onset three days. Associated symptoms include: facial pain and neck  pain.   Differential Diagnosis:   Differential Diagnosis includes, but is not limited to:  Rfeugio's angina, acute necrotizing ulcerative gingivitis, epiglottitis, parotitis, gingival abscess, facial cellulitis, peritonsillar/retropharyngeal abscess, sialolithiasis, periapical abscess, pulpitis, dental fracture, dental caries, aphthous ulcer.   Clinical Tests:   Lab Tests: Reviewed and Ordered  Radiological Study: Ordered and Reviewed  ED Management:  - VSS; pt afebrile; mild distress  - CBC w/diff H/H stable; no significant leukocytosis  - BMP without significant electrolyte abnormality; renal function WNL   - concern for more deep space infection, Ludwigs - CT soft tissue neck with IV contrast obtained, but no findings to suggest Refugio's or deep space abscess; there is some enlargement of the cervical LN chain per final radiology read  - pt reports near complete resolution of pain with single dose of IV ketorolac  - pt stable for discharge at this time; pt encouraged to continue taking prescribed abx  - pt given very strict ED return precautions for any new or worsening of symptoms; pt verbalized understanding and expressed a willingness to comply with my recommendations  - No further intervention is indicated at this time after having taken into account the patient's history, physical exam findings, and empirical and objective data obtained during the patient's emergency department workup.   - The patient is at low risk for an emergent medical condition at this time, and I am of the belief that that it is safe to discharge the patient from the emergency department.   - The patient is instructed to follow up as outpatient as indicated on the discharge paperwork.    - I have discussed the specifics of the workup with the patient and the patient has verbalized understanding of the details of the workup, the diagnosis, the treatment plan, and the need for outpatient follow-up.    - Although the patient has no  emergent etiology today this does not preclude the development of an emergent condition so, in addition, I have advised the patient that they can return to the ED and/or activate EMS at any time with worsening of their symptoms, change of their symptoms, or with any other medical complaint.    - The patient remained comfortable and stable during their visit in the ED.    - Discharge and follow-up instructions discussed with the patient who expressed understanding and willingness to comply with my recommendations.  - Results of all emergency department tests  discussed thoroughly with patient; all patient questions answered; pt in agreement with plan  - Pt instructed to follow up with PCP in 2-3 days for recheck of today's complaints  - Pt given strict emergency department return precautions for any new or worsening of symptoms  - Pt discharged from the emergency department in stable condition, in no acute distress           Scribe Attestation:   Scribe #1: I performed the above scribed service and the documentation accurately describes the services I performed. I attest to the accuracy of the note.      ED Course as of 11/25/22 1025   Fri Nov 25, 2022   0955 Patient reports relief of her pain following administration of IV ketorolac. [LC]      ED Course User Index  [LC] Refugio Sepulveda MD                 Clinical Impression:   Final diagnoses:  [K08.89] Pain, dental (Primary)      ED Disposition Condition    Discharge Stable          ED Prescriptions       Medication Sig Dispense Start Date End Date Auth. Provider    ketorolac (TORADOL) 10 mg tablet Take 1 tablet (10 mg total) by mouth every 6 (six) hours as needed for Pain. 20 tablet 11/25/2022 11/28/2022 Refugio Sepulveda MD          Follow-up Information    None          Refugio Sepulveda MD  11/25/22 1028

## 2023-01-06 ENCOUNTER — OFFICE VISIT (OUTPATIENT)
Dept: UROGYNECOLOGY | Facility: CLINIC | Age: 48
End: 2023-01-06
Payer: COMMERCIAL

## 2023-01-06 VITALS
HEIGHT: 69 IN | WEIGHT: 293 LBS | DIASTOLIC BLOOD PRESSURE: 96 MMHG | BODY MASS INDEX: 43.4 KG/M2 | SYSTOLIC BLOOD PRESSURE: 166 MMHG

## 2023-01-06 DIAGNOSIS — N89.8 VAGINAL LESION: Primary | ICD-10-CM

## 2023-01-06 PROCEDURE — 99999 PR PBB SHADOW E&M-EST. PATIENT-LVL III: CPT | Mod: PBBFAC,,, | Performed by: OBSTETRICS & GYNECOLOGY

## 2023-01-06 PROCEDURE — 3077F PR MOST RECENT SYSTOLIC BLOOD PRESSURE >= 140 MM HG: ICD-10-PCS | Mod: CPTII,S$GLB,, | Performed by: OBSTETRICS & GYNECOLOGY

## 2023-01-06 PROCEDURE — 3077F SYST BP >= 140 MM HG: CPT | Mod: CPTII,S$GLB,, | Performed by: OBSTETRICS & GYNECOLOGY

## 2023-01-06 PROCEDURE — 99213 OFFICE O/P EST LOW 20 MIN: CPT | Mod: S$GLB,,, | Performed by: OBSTETRICS & GYNECOLOGY

## 2023-01-06 PROCEDURE — 99213 PR OFFICE/OUTPT VISIT, EST, LEVL III, 20-29 MIN: ICD-10-PCS | Mod: S$GLB,,, | Performed by: OBSTETRICS & GYNECOLOGY

## 2023-01-06 PROCEDURE — 99999 PR PBB SHADOW E&M-EST. PATIENT-LVL III: ICD-10-PCS | Mod: PBBFAC,,, | Performed by: OBSTETRICS & GYNECOLOGY

## 2023-01-06 PROCEDURE — 3080F PR MOST RECENT DIASTOLIC BLOOD PRESSURE >= 90 MM HG: ICD-10-PCS | Mod: CPTII,S$GLB,, | Performed by: OBSTETRICS & GYNECOLOGY

## 2023-01-06 PROCEDURE — 1159F PR MEDICATION LIST DOCUMENTED IN MEDICAL RECORD: ICD-10-PCS | Mod: CPTII,S$GLB,, | Performed by: OBSTETRICS & GYNECOLOGY

## 2023-01-06 PROCEDURE — 3008F BODY MASS INDEX DOCD: CPT | Mod: CPTII,S$GLB,, | Performed by: OBSTETRICS & GYNECOLOGY

## 2023-01-06 PROCEDURE — 1159F MED LIST DOCD IN RCRD: CPT | Mod: CPTII,S$GLB,, | Performed by: OBSTETRICS & GYNECOLOGY

## 2023-01-06 PROCEDURE — 3080F DIAST BP >= 90 MM HG: CPT | Mod: CPTII,S$GLB,, | Performed by: OBSTETRICS & GYNECOLOGY

## 2023-01-06 PROCEDURE — 3008F PR BODY MASS INDEX (BMI) DOCUMENTED: ICD-10-PCS | Mod: CPTII,S$GLB,, | Performed by: OBSTETRICS & GYNECOLOGY

## 2023-01-06 RX ORDER — FLUTICASONE PROPIONATE AND SALMETEROL 50; 250 UG/1; UG/1
1 POWDER RESPIRATORY (INHALATION) 2 TIMES DAILY
COMMUNITY
Start: 2022-09-01 | End: 2024-02-28

## 2023-01-06 RX ORDER — BETAMETHASONE DIPROPIONATE 0.5 MG/G
CREAM TOPICAL 2 TIMES DAILY
Qty: 15 G | Refills: 3 | Status: SHIPPED | OUTPATIENT
Start: 2023-01-06 | End: 2024-01-24

## 2023-01-06 RX ORDER — IBUPROFEN 800 MG/1
800 TABLET ORAL EVERY 8 HOURS PRN
COMMUNITY
Start: 2022-11-23 | End: 2023-08-14

## 2023-01-06 NOTE — PROGRESS NOTES
Subjective:      Patient ID: Belinda Schultz is a 47 y.o. female.    Chief Complaint:  Post-op Evaluation      History of Present Illness  Several months postop from spot resection of point in at introitus this is something that we tried conservative measures for many years we finally went with resection pathology showed an area of chronic inflammation.  Patient was subsequently able to move forward with coital activity but now returns stating that there was discomfort shows an image of what looks like an ulcer at the position but the area overall looks well healed          Past Medical History:   Diagnosis Date    ADHD     Cyst of Bartholin's gland     Inflammation of joint of right knee     Scar tissue     vaginal    Unspecified chronic bronchitis        Past Surgical History:   Procedure Laterality Date    bartholin gland removal      BIOPSY OF VAGINA Right 2022    Procedure: BIOPSY, VAGINA;  Surgeon: Feliciano Montes MD;  Location: [facility];  Service: OB/GYN;  Laterality: Right;     SECTION, CLASSIC      HYSTERECTOMY         GYN & OB History  No LMP recorded. Patient has had a hysterectomy.   Date of Last Pap: No result found    OB History    Para Term  AB Living   2 2 2     2   SAB IAB Ectopic Multiple Live Births           2      # Outcome Date GA Lbr Kang/2nd Weight Sex Delivery Anes PTL Lv   2 Term  40w0d   F CS-LTranv   KAREN   1 Term  40w0d   F CS-LTranv   KAREN       Health Maintenance         Date Due Completion Date    Hepatitis C Screening Never done ---    HIV Screening Never done ---    TETANUS VACCINE Never done ---    Mammogram Never done ---    COVID-19 Vaccine (3 - Booster for Pfizer series) 2021 10/10/2021    Influenza Vaccine (1) Never done ---    Hemoglobin A1c (Diabetic Prevention Screening) 2023    Colorectal Cancer Screening 2024    Lipid Panel 2025            Family History   Problem Relation Age of Onset     "Cancer Mother        Social History     Socioeconomic History    Marital status:    Tobacco Use    Smoking status: Never    Smokeless tobacco: Never   Substance and Sexual Activity    Alcohol use: Yes     Comment: social occassional    Drug use: No    Sexual activity: Yes     Partners: Male     Birth control/protection: None       Review of Systems  Review of Systems  Pink pinpoint tenderness     Objective:   BP (!) 166/96 (BP Location: Right arm, Patient Position: Sitting, BP Method: Large (Manual))   Ht 5' 9" (1.753 m)   Wt (!) 147 kg (324 lb 1.2 oz)   BMI 47.86 kg/m²         Physical Exam  Patient with an area of ulceration at about 630 overall excellent healing      Assessment:     1. Vaginal lesion            Plan:     1. Vaginal lesion      This could be another form of chronic inflammation in that area I am going to start with Diprolene pea size area twice the day for the next 30 days and then once a day for the remaining 2 weeks I will see the patient back in 6 weeks if this does not work at that time I will do various cultures if it is still present as well as possibly bring in wait for those results t    Patient appreciated attention to this at this point I am very leery for repeat surgical intervention  There are no Patient Instructions on file for this visit.        "

## 2023-02-17 NOTE — OR NURSING
Spoke with Dr. Gotti, re patients BMI and recent bronchitis episode in July, no other medical history . She states she's ok for surgery here.  
Applied

## 2023-07-03 RX ORDER — LIDOCAINE HYDROCHLORIDE 20 MG/ML
JELLY TOPICAL
Qty: 30 ML | Refills: 3 | Status: SHIPPED | OUTPATIENT
Start: 2023-07-03 | End: 2023-08-02

## 2023-07-03 RX ORDER — LIDOCAINE HYDROCHLORIDE 20 MG/ML
JELLY TOPICAL
Status: CANCELLED | OUTPATIENT
Start: 2023-07-03

## 2023-07-03 RX ORDER — LIDOCAINE HYDROCHLORIDE 20 MG/ML
JELLY TOPICAL
COMMUNITY
End: 2023-07-03 | Stop reason: SDUPTHER

## 2023-07-03 NOTE — TELEPHONE ENCOUNTER
----- Message from Devyn Johnson sent at 7/3/2023  2:31 PM CDT -----  Who Called:VINCENZO TURNER [[id number]]           New Prescription or Refill : Refill      RX Name and Strength:  lidocaine HCL 2% (XYLOCAINE) 2 % jelly             Local or Mail Order : Local   Mail Order            Preferred Pharmacy:[id number] [place] [facility] [address] [facility]      Would the patient rather a call back or a response via [facility]?Yes        Best Call Back Number:  [phone]        Additional Information: Pt would like to know when its approved.Please call back to further assist.

## 2023-07-06 ENCOUNTER — PATIENT MESSAGE (OUTPATIENT)
Dept: UROGYNECOLOGY | Facility: CLINIC | Age: 48
End: 2023-07-06
Payer: COMMERCIAL

## 2023-07-10 ENCOUNTER — PATIENT MESSAGE (OUTPATIENT)
Dept: UROGYNECOLOGY | Facility: CLINIC | Age: 48
End: 2023-07-10
Payer: COMMERCIAL

## 2023-07-10 RX ORDER — LIDOCAINE AND PRILOCAINE 25; 25 MG/G; MG/G
CREAM TOPICAL
Qty: 30 G | Refills: 3 | Status: SHIPPED | OUTPATIENT
Start: 2023-07-10 | End: 2024-01-25

## 2023-08-09 ENCOUNTER — PATIENT MESSAGE (OUTPATIENT)
Dept: UROGYNECOLOGY | Facility: CLINIC | Age: 48
End: 2023-08-09
Payer: COMMERCIAL

## 2023-08-14 ENCOUNTER — HOSPITAL ENCOUNTER (OUTPATIENT)
Dept: RADIOLOGY | Facility: HOSPITAL | Age: 48
Discharge: HOME | End: 2023-08-14
Attending: NURSE PRACTITIONER
Payer: COMMERCIAL

## 2023-08-14 ENCOUNTER — OFFICE VISIT (OUTPATIENT)
Dept: INTERNAL MEDICINE | Facility: CLINIC | Age: 48
End: 2023-08-14
Payer: COMMERCIAL

## 2023-08-14 VITALS
BODY MASS INDEX: 43.4 KG/M2 | SYSTOLIC BLOOD PRESSURE: 130 MMHG | HEART RATE: 68 BPM | HEIGHT: 69 IN | WEIGHT: 293 LBS | DIASTOLIC BLOOD PRESSURE: 80 MMHG | OXYGEN SATURATION: 99 %

## 2023-08-14 DIAGNOSIS — R60.0 EDEMA OF RIGHT LOWER EXTREMITY: ICD-10-CM

## 2023-08-14 DIAGNOSIS — R20.0 NUMBNESS OF RIGHT ANTERIOR THIGH: Primary | ICD-10-CM

## 2023-08-14 DIAGNOSIS — E66.01 MORBIDLY OBESE: ICD-10-CM

## 2023-08-14 DIAGNOSIS — R03.0 ELEVATED BLOOD PRESSURE READING WITHOUT DIAGNOSIS OF HYPERTENSION: ICD-10-CM

## 2023-08-14 DIAGNOSIS — R20.0 NUMBNESS OF RIGHT ANTERIOR THIGH: ICD-10-CM

## 2023-08-14 PROCEDURE — 93971 EXTREMITY STUDY: CPT | Mod: TC,RT

## 2023-08-14 PROCEDURE — 99214 OFFICE O/P EST MOD 30 MIN: CPT | Mod: S$GLB,,, | Performed by: NURSE PRACTITIONER

## 2023-08-14 PROCEDURE — 99999 PR PBB SHADOW E&M-EST. PATIENT-LVL V: ICD-10-PCS | Mod: PBBFAC,,, | Performed by: NURSE PRACTITIONER

## 2023-08-14 PROCEDURE — 3075F SYST BP GE 130 - 139MM HG: CPT | Mod: CPTII,S$GLB,, | Performed by: NURSE PRACTITIONER

## 2023-08-14 PROCEDURE — 99214 PR OFFICE/OUTPT VISIT, EST, LEVL IV, 30-39 MIN: ICD-10-PCS | Mod: S$GLB,,, | Performed by: NURSE PRACTITIONER

## 2023-08-14 PROCEDURE — 93971 EXTREMITY STUDY: CPT | Mod: 26,RT,, | Performed by: RADIOLOGY

## 2023-08-14 PROCEDURE — 93971 US LOWER EXTREMITY VEINS RIGHT: ICD-10-PCS | Mod: 26,RT,, | Performed by: RADIOLOGY

## 2023-08-14 PROCEDURE — 3079F PR MOST RECENT DIASTOLIC BLOOD PRESSURE 80-89 MM HG: ICD-10-PCS | Mod: CPTII,S$GLB,, | Performed by: NURSE PRACTITIONER

## 2023-08-14 PROCEDURE — 3079F DIAST BP 80-89 MM HG: CPT | Mod: CPTII,S$GLB,, | Performed by: NURSE PRACTITIONER

## 2023-08-14 PROCEDURE — 3008F PR BODY MASS INDEX (BMI) DOCUMENTED: ICD-10-PCS | Mod: CPTII,S$GLB,, | Performed by: NURSE PRACTITIONER

## 2023-08-14 PROCEDURE — 1159F MED LIST DOCD IN RCRD: CPT | Mod: CPTII,S$GLB,, | Performed by: NURSE PRACTITIONER

## 2023-08-14 PROCEDURE — 1159F PR MEDICATION LIST DOCUMENTED IN MEDICAL RECORD: ICD-10-PCS | Mod: CPTII,S$GLB,, | Performed by: NURSE PRACTITIONER

## 2023-08-14 PROCEDURE — 1160F RVW MEDS BY RX/DR IN RCRD: CPT | Mod: CPTII,S$GLB,, | Performed by: NURSE PRACTITIONER

## 2023-08-14 PROCEDURE — 3075F PR MOST RECENT SYSTOLIC BLOOD PRESS GE 130-139MM HG: ICD-10-PCS | Mod: CPTII,S$GLB,, | Performed by: NURSE PRACTITIONER

## 2023-08-14 PROCEDURE — 3008F BODY MASS INDEX DOCD: CPT | Mod: CPTII,S$GLB,, | Performed by: NURSE PRACTITIONER

## 2023-08-14 PROCEDURE — 1160F PR REVIEW ALL MEDS BY PRESCRIBER/CLIN PHARMACIST DOCUMENTED: ICD-10-PCS | Mod: CPTII,S$GLB,, | Performed by: NURSE PRACTITIONER

## 2023-08-14 PROCEDURE — 99999 PR PBB SHADOW E&M-EST. PATIENT-LVL V: CPT | Mod: PBBFAC,,, | Performed by: NURSE PRACTITIONER

## 2023-08-14 RX ORDER — METHOCARBAMOL 500 MG/1
500 TABLET, FILM COATED ORAL 3 TIMES DAILY PRN
Qty: 30 TABLET | Refills: 0 | Status: SHIPPED | OUTPATIENT
Start: 2023-08-14

## 2023-08-14 RX ORDER — CETIRIZINE HYDROCHLORIDE 10 MG/1
10 TABLET ORAL
COMMUNITY
Start: 2023-07-27

## 2023-08-14 RX ORDER — ATORVASTATIN CALCIUM 10 MG/1
10 TABLET, FILM COATED ORAL NIGHTLY
COMMUNITY
Start: 2023-05-12 | End: 2024-02-28

## 2023-08-14 NOTE — PATIENT INSTRUCTIONS
Check labs for underlying causes    Check right lower extremity ultrasound    Elevate legs    Compression stockings if on feet long periods    Monitor BP at home, goal BP < or = 140/80, call office if consistently above this range.    Follow low salt DASH diet and exercise.    BMI reviewed.    Go to ED if Headaches, blurred vision, chest pain, or SOB occurs along with elevated readings > or = 160/90.

## 2023-08-14 NOTE — PROGRESS NOTES
"Subjective     Patient ID: Belinda Schultz is a 48 y.o. female.    Chief Complaint: Numbness    Pt new to me, here for, "Concerned about circulation issues."    Has been having right thigh numbnes and tingling for about a month now that is getting worse. Concerned about circulation issues. Denies any injury, falls, or trauma. Sometimes runs down into her feet and starts from her right lower back also. 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      Review of Systems   Respiratory:  Negative for chest tightness and shortness of breath.    Cardiovascular:  Positive for leg swelling. Negative for chest pain, palpitations and claudication.        As documented in HPI     Gastrointestinal:  Negative for abdominal pain, constipation, diarrhea, nausea and vomiting.   Endocrine: Negative for heat intolerance, polydipsia, polyphagia and polyuria.   Musculoskeletal:  Positive for back pain and leg pain. Negative for arthralgias and myalgias.        As documented in HPI     Allergic/Immunologic: Negative for environmental allergies, food allergies and immunocompromised state.   Neurological:  Positive for numbness. Negative for dizziness and weakness.        As documented in HPI     Hematological:  Negative for adenopathy. Does not bruise/bleed easily.   Psychiatric/Behavioral:  Negative for suicidal ideas.        Review of patient's allergies indicates:  No Known Allergies      Current Outpatient Medications:     ADVAIR DISKUS 250-50 mcg/dose diskus inhaler, Inhale 1 puff into the lungs 2 (two) times daily., Disp: , Rfl:     atorvastatin (LIPITOR) 10 MG tablet, Take 10 mg by mouth every evening., Disp: , Rfl:     betamethasone dipropionate 0.05 % cream, Apply topically 2 (two) times daily., Disp: 15 g, Rfl: 3    dextroamphetamine-amphetamine (ADDERALL XR) 15 MG 24 hr capsule, Take by mouth one capsule every morning, Disp: 30 capsule, Rfl: 0    " "diclofenac (VOLTAREN) 75 MG EC tablet, Take 75 mg by mouth 2 (two) times daily., Disp: , Rfl:     ergocalciferol (ERGOCALCIFEROL) 50,000 unit Cap, Take 50,000 Units by mouth every 7 days., Disp: , Rfl:     cetirizine (ZYRTEC) 10 MG tablet, Take 10 mg by mouth., Disp: , Rfl:     Patient Active Problem List   Diagnosis    Rectal bleeding    Rectal pain    Constipation    Rectal lesion    Bartholin cyst    Chronic pain of right knee    Chronic bronchitis with acute exacerbation    Morbid obesity with BMI of 45.0-49.9, adult     Past Medical History:   Diagnosis Date    ADHD     Cyst of Bartholin's gland     Inflammation of joint of right knee     Scar tissue     vaginal    Unspecified chronic bronchitis      Past Surgical History:   Procedure Laterality Date    bartholin gland removal      BIOPSY OF VAGINA Right 2022    Procedure: BIOPSY, VAGINA;  Surgeon: Feliciano Montes MD;  Location: [facility] OR;  Service: OB/GYN;  Laterality: Right;     SECTION, CLASSIC      HYSTERECTOMY       Social History     Socioeconomic History    Marital status:    Tobacco Use    Smoking status: Never    Smokeless tobacco: Never   Substance and Sexual Activity    Alcohol use: Yes     Comment: social occassional    Drug use: No    Sexual activity: Yes     Partners: Male     Birth control/protection: None     Family History   Problem Relation Age of Onset    Cancer Mother           Objective   Vitals:    23 1535 23 1559   BP: (!) 160/70 130/80   Pulse: 68    SpO2: 99%    Weight: (!) 150.2 kg (331 lb 2.1 oz)    Height: 5' 9" (1.753 m)    PainSc: 0-No pain      Body mass index is 48.9 kg/m².    Physical Exam  Vitals and nursing note reviewed.   Constitutional:       Appearance: She is obese.   HENT:      Head: Normocephalic.      Nose: Nose normal.      Mouth/Throat:      Mouth: Mucous membranes are moist.   Eyes:      Conjunctiva/sclera: Conjunctivae normal.   Cardiovascular:      Rate and Rhythm: Normal rate. "   Pulmonary:      Effort: Pulmonary effort is normal.   Musculoskeletal:         General: Normal range of motion.      Right lower le+ Edema present.      Left lower le+ Edema present.   Skin:     General: Skin is warm and dry.      Capillary Refill: Capillary refill takes less than 2 seconds.   Neurological:      General: No focal deficit present.      Mental Status: She is alert and oriented to person, place, and time.   Psychiatric:         Mood and Affect: Mood normal.         Behavior: Behavior normal.         Thought Content: Thought content normal.         Judgment: Judgment normal.            Assessment and Plan     1. Numbness of right anterior thigh  -     CBC Auto Differential; Future; Expected date: 2023  -     Iron and TIBC; Future; Expected date: 2023  -     Ferritin; Future; Expected date: 2023  -     Magnesium; Future; Expected date: 2023  -     Comprehensive Metabolic Panel; Future; Expected date: 2023  -     US Lower Extremity Veins Right; Future; Expected date: 2023  -     Hemoglobin A1C; Future; Expected date: 2023    Check labs for underlying causes    Check right lower extremity ultrasound    Elevate legs    Compression stockings if on feet long periods    -     methocarbamoL (ROBAXIN) 500 MG Tab; Take 1 tablet (500 mg total) by mouth 3 (three) times daily as needed (lower back and leg pain).    2. Edema of right lower extremity  -     CBC Auto Differential; Future; Expected date: 2023  -     Iron and TIBC; Future; Expected date: 2023  -     Ferritin; Future; Expected date: 2023  -     Magnesium; Future; Expected date: 2023  -     Comprehensive Metabolic Panel; Future; Expected date: 2023  -     US Lower Extremity Veins Right; Future; Expected date: 2023    Check labs for underlying causes    Check right lower extremity ultrasound    Elevate legs    Compression stockings if on feet long periods    3. BMI  45.0-49.9, adult  BMI reviewed    4. Morbidly obese  BMI reviewed.    Diet and exercise to lose weight.    5. Elevated blood pressure reading without diagnosis of hypertension  Monitor BP at home, goal BP < or = 140/80, call office if consistently above this range.    Follow low salt DASH diet and exercise.    BMI reviewed.    Go to ED if Headaches, blurred vision, chest pain, or SOB occurs along with elevated readings > or = 160/90.    Sciatica exercises in portal on AVS in case these tests check out    Self care instructions provided in AVS      Follow up if symptoms worsen or fail to improve.

## 2024-01-24 RX ORDER — BETAMETHASONE DIPROPIONATE 0.5 MG/G
1 CREAM TOPICAL 2 TIMES DAILY
Qty: 15 G | Refills: 0 | Status: SHIPPED | OUTPATIENT
Start: 2024-01-24 | End: 2024-02-28

## 2024-01-24 NOTE — TELEPHONE ENCOUNTER
Refill Routing Note   Medication(s) are not appropriate for processing by [facility] for the following reason(s):        Outside of protocol    [facility] action(s):  Route  Approve               Appointments  past 12m or future 3m with PCP    Date Provider   Last Visit   1/6/2023 Feliciano Montes MD   Next Visit   Visit date not found Feliciano Montes MD   ED visits in past 90 days: 0        Note composed:4:42 PM 01/24/2024

## 2024-01-25 RX ORDER — LIDOCAINE AND PRILOCAINE 25; 25 MG/G; MG/G
CREAM TOPICAL
Qty: 30 G | Refills: 3 | Status: SHIPPED | OUTPATIENT
Start: 2024-01-25 | End: 2024-02-24

## 2024-02-28 ENCOUNTER — HOSPITAL ENCOUNTER (OUTPATIENT)
Dept: RADIOLOGY | Facility: HOSPITAL | Age: 49
Discharge: HOME | End: 2024-02-28
Attending: FAMILY MEDICINE
Payer: COMMERCIAL

## 2024-02-28 DIAGNOSIS — R07.81 RIB PAIN ON RIGHT SIDE: ICD-10-CM

## 2024-02-28 PROCEDURE — 71100 X-RAY EXAM RIBS UNI 2 VIEWS: CPT | Mod: 26,RT,, | Performed by: RADIOLOGY

## 2024-02-28 PROCEDURE — 71100 X-RAY EXAM RIBS UNI 2 VIEWS: CPT | Mod: TC,FY,RT

## 2024-08-17 ENCOUNTER — HOSPITAL ENCOUNTER (EMERGENCY)
Facility: HOSPITAL | Age: 49
Discharge: HOME | End: 2024-08-17
Payer: COMMERCIAL

## 2024-08-17 VITALS
RESPIRATION RATE: 18 BRPM | OXYGEN SATURATION: 100 % | WEIGHT: 293 LBS | TEMPERATURE: 98 F | HEIGHT: 69 IN | DIASTOLIC BLOOD PRESSURE: 74 MMHG | HEART RATE: 74 BPM | SYSTOLIC BLOOD PRESSURE: 132 MMHG | BODY MASS INDEX: 43.4 KG/M2

## 2024-08-17 DIAGNOSIS — R10.9 RIGHT FLANK PAIN: Primary | ICD-10-CM

## 2024-08-17 LAB
ALBUMIN SERPL BCP-MCNC: 3.7 G/DL (ref 3.5–5.2)
ALP SERPL-CCNC: 78 U/L (ref 55–135)
ALT SERPL W/O P-5'-P-CCNC: 11 U/L (ref 10–44)
ANION GAP SERPL CALC-SCNC: 8 MMOL/L (ref 8–16)
AST SERPL-CCNC: 11 U/L (ref 10–40)
BACTERIA #/AREA URNS HPF: NORMAL /HPF
BASOPHILS # BLD AUTO: 0.01 K/UL (ref 0–0.2)
BASOPHILS NFR BLD: 0.1 % (ref 0–1.9)
BILIRUB SERPL-MCNC: 0.2 MG/DL (ref 0.1–1)
BILIRUB UR QL STRIP: NEGATIVE
BUN SERPL-MCNC: 12 MG/DL (ref 6–20)
CALCIUM SERPL-MCNC: 9.2 MG/DL (ref 8.7–10.5)
CHLORIDE SERPL-SCNC: 104 MMOL/L (ref 95–110)
CLARITY UR: ABNORMAL
CO2 SERPL-SCNC: 26 MMOL/L (ref 23–29)
COLOR UR: YELLOW
CREAT SERPL-MCNC: 0.8 MG/DL (ref 0.5–1.4)
DIFFERENTIAL METHOD BLD: NORMAL
EOSINOPHIL # BLD AUTO: 0.2 K/UL (ref 0–0.5)
EOSINOPHIL NFR BLD: 2.3 % (ref 0–8)
ERYTHROCYTE [DISTWIDTH] IN BLOOD BY AUTOMATED COUNT: 13.7 % (ref 11.5–14.5)
EST. GFR  (NO RACE VARIABLE): >60 ML/MIN/1.73 M^2
GLUCOSE SERPL-MCNC: 103 MG/DL (ref 70–110)
GLUCOSE UR QL STRIP: NEGATIVE
HCT VFR BLD AUTO: 38.8 % (ref 37–48.5)
HGB BLD-MCNC: 12.5 G/DL (ref 12–16)
HGB UR QL STRIP: ABNORMAL
IMM GRANULOCYTES # BLD AUTO: 0.02 K/UL (ref 0–0.04)
IMM GRANULOCYTES NFR BLD AUTO: 0.3 % (ref 0–0.5)
KETONES UR QL STRIP: NEGATIVE
LEUKOCYTE ESTERASE UR QL STRIP: ABNORMAL
LIPASE SERPL-CCNC: 59 U/L (ref 4–60)
LYMPHOCYTES # BLD AUTO: 2.7 K/UL (ref 1–4.8)
LYMPHOCYTES NFR BLD: 38.8 % (ref 18–48)
MCH RBC QN AUTO: 29.4 PG (ref 27–31)
MCHC RBC AUTO-ENTMCNC: 32.2 G/DL (ref 32–36)
MCV RBC AUTO: 91 FL (ref 82–98)
MICROSCOPIC COMMENT: NORMAL
MONOCYTES # BLD AUTO: 0.6 K/UL (ref 0.3–1)
MONOCYTES NFR BLD: 8 % (ref 4–15)
NEUTROPHILS # BLD AUTO: 3.5 K/UL (ref 1.8–7.7)
NEUTROPHILS NFR BLD: 50.5 % (ref 38–73)
NITRITE UR QL STRIP: NEGATIVE
NRBC BLD-RTO: 0 /100 WBC
PH UR STRIP: 7 [PH] (ref 5–8)
PLATELET # BLD AUTO: 327 K/UL (ref 150–450)
PMV BLD AUTO: 10.2 FL (ref 9.2–12.9)
POTASSIUM SERPL-SCNC: 3.5 MMOL/L (ref 3.5–5.1)
PROT SERPL-MCNC: 7.8 G/DL (ref 6–8.4)
PROT UR QL STRIP: ABNORMAL
RBC # BLD AUTO: 4.25 M/UL (ref 4–5.4)
RBC #/AREA URNS HPF: 4 /HPF (ref 0–4)
SODIUM SERPL-SCNC: 138 MMOL/L (ref 136–145)
SP GR UR STRIP: 1.03 (ref 1–1.03)
SQUAMOUS #/AREA URNS HPF: 14 /HPF
URN SPEC COLLECT METH UR: ABNORMAL
UROBILINOGEN UR STRIP-ACNC: NEGATIVE EU/DL
WBC # BLD AUTO: 6.98 K/UL (ref 3.9–12.7)
WBC #/AREA URNS HPF: 5 /HPF (ref 0–5)
YEAST URNS QL MICRO: NORMAL

## 2024-08-17 PROCEDURE — 25000003 PHARM REV CODE 250: Performed by: NURSE PRACTITIONER

## 2024-08-17 PROCEDURE — 96374 THER/PROPH/DIAG INJ IV PUSH: CPT

## 2024-08-17 PROCEDURE — 96375 TX/PRO/DX INJ NEW DRUG ADDON: CPT

## 2024-08-17 PROCEDURE — 80053 COMPREHEN METABOLIC PANEL: CPT

## 2024-08-17 PROCEDURE — 81000 URINALYSIS NONAUTO W/SCOPE: CPT | Performed by: NURSE PRACTITIONER

## 2024-08-17 PROCEDURE — 85025 COMPLETE CBC W/AUTO DIFF WBC: CPT | Performed by: NURSE PRACTITIONER

## 2024-08-17 PROCEDURE — 83690 ASSAY OF LIPASE: CPT

## 2024-08-17 PROCEDURE — 25000003 PHARM REV CODE 250

## 2024-08-17 PROCEDURE — 63600175 PHARM REV CODE 636 W HCPCS: Performed by: NURSE PRACTITIONER

## 2024-08-17 PROCEDURE — 99285 EMERGENCY DEPT VISIT HI MDM: CPT | Mod: 25

## 2024-08-17 PROCEDURE — 96361 HYDRATE IV INFUSION ADD-ON: CPT

## 2024-08-17 RX ORDER — LIDOCAINE 50 MG/G
1 PATCH TOPICAL
Status: DISCONTINUED | OUTPATIENT
Start: 2024-08-17 | End: 2024-08-17 | Stop reason: HOSPADM

## 2024-08-17 RX ORDER — KETOROLAC TROMETHAMINE 30 MG/ML
10 INJECTION, SOLUTION INTRAMUSCULAR; INTRAVENOUS
Status: COMPLETED | OUTPATIENT
Start: 2024-08-17 | End: 2024-08-17

## 2024-08-17 RX ORDER — ONDANSETRON HYDROCHLORIDE 2 MG/ML
4 INJECTION, SOLUTION INTRAVENOUS
Status: COMPLETED | OUTPATIENT
Start: 2024-08-17 | End: 2024-08-17

## 2024-08-17 RX ORDER — NAPROXEN 500 MG/1
500 TABLET ORAL 2 TIMES DAILY
Qty: 60 TABLET | Refills: 0 | Status: SHIPPED | OUTPATIENT
Start: 2024-08-17

## 2024-08-17 RX ORDER — LIDOCAINE 50 MG/G
1 PATCH TOPICAL DAILY
Qty: 6 PATCH | Refills: 0 | Status: SHIPPED | OUTPATIENT
Start: 2024-08-17

## 2024-08-17 RX ADMIN — SODIUM CHLORIDE 1000 ML: 9 INJECTION, SOLUTION INTRAVENOUS at 04:08

## 2024-08-17 RX ADMIN — KETOROLAC TROMETHAMINE 10 MG: 30 INJECTION, SOLUTION INTRAMUSCULAR; INTRAVENOUS at 04:08

## 2024-08-17 RX ADMIN — LIDOCAINE 1 PATCH: 50 PATCH CUTANEOUS at 07:08

## 2024-08-17 RX ADMIN — ONDANSETRON 4 MG: 2 INJECTION INTRAMUSCULAR; INTRAVENOUS at 04:08

## 2024-08-17 NOTE — DISCHARGE INSTRUCTIONS
Ms. Schultz,     Thank you for letting me care for you today! It was nice meeting you, and I hope you feel better soon.   If you would like access to your chart and what was done today please utilize the [facility].   Please don't hesitate to return if your symptoms worsen or you develop any other worrisome symptoms.    Our goal in the emergency department is to always give you outstanding care and exceptional service. You may receive a survey by mail or e-mail in the next week regarding your experience in our ED. We would greatly appreciate you completing and returning the survey. Your feedback provides us with a way to recognize our staff who give very good care and it helps us learn how to improve when your experience was below our aspiration of excellence.     Sincerely,    Brittany Brady PA-C  Emergency Department Physician Assistant  [facility] [place] [place]

## 2024-08-17 NOTE — FIRST PROVIDER EVALUATION
Emergency Department TeleTriage Encounter Note      CHIEF COMPLAINT    Chief Complaint   Patient presents with    Flank Pain     Pt states R sided upper flank pain x1 year, started having nausea and acid reflux this am.  Pt denies dysuria, denies fever/chills.  Pt denies chest pain, SOB.       VITAL SIGNS   Initial Vitals [08/17/24 1601]   BP Pulse Resp Temp SpO2   132/74 74 18 98 °F (36.7 °C) 100 %      MAP       --            ALLERGIES    Review of patient's allergies indicates:  No Known Allergies    PROVIDER TRIAGE NOTE  This is a teletriage evaluation of a 49 y.o. female presenting to the ED complaining of intermittent right flank pain for one year, worse today. Reports nausea and chills.     Alert, no distress.     Initial orders will be placed and care will be transferred to an alternate provider when patient is roomed for a full evaluation. Any additional orders and the final disposition will be determined by that provider.         ORDERS  Labs Reviewed - No data to display    ED Orders (720h ago, onward)      Start Ordered     Status Ordering Provider    08/17/24 1615 08/17/24 1611  sodium chloride 0.9% bolus 1,000 mL 1,000 mL  Once         Ordered BETH OSULLIVAN NCl    08/17/24 1615 08/17/24 1611  ketorolac injection 9.999 mg  ED 1 Time         Ordered BETH OSULLIVAN    08/17/24 1615 08/17/24 1611  ondansetron injection 4 mg  ED 1 Time         Ordered BETH OSULLIVAN    08/17/24 1612 08/17/24 1611  Vital signs  Every 2 hours         Ordered BETH OSULLIVAN N.    08/17/24 1612 08/17/24 1611  Diet NPO  Diet effective now         Ordered BETH OSULLIVAN    08/17/24 1612 08/17/24 1611  Insert peripheral IV  Once         Ordered BETH OSULLIVAN    08/17/24 1612 08/17/24 1611  CBC W/ AUTO DIFFERENTIAL  STAT         Ordered BETH OSULLIVAN    08/17/24 1612 08/17/24 1611  Comp. Metabolic Panel  STAT         Ordered BETH OSULLIVAN     08/17/24 1612 08/17/24 1611  Lipase  STAT         Ordered BETH OSULLIVAN.    08/17/24 1612 08/17/24 1611  Urinalysis, Reflex to Urine Culture Urine, Clean Catch  STAT         Ordered BETH OSULLIVAN.              Virtual Visit Note: The provider triage portion of this emergency department evaluation and documentation was performed via [employer], a HIPAA-compliant telemedicine application, in concert with a tele-presenter in the room. A face to face patient evaluation with one of my colleagues will occur once the patient is placed in an emergency department room.      DISCLAIMER: This note was prepared with [employer] voice recognition transcription software. Garbled syntax, mangled pronouns, and other bizarre constructions may be attributed to that software system.

## 2024-08-17 NOTE — ED NOTES
Pt reports to ED with chronic right side flank pain. Pt state that she is currently under care for her flank pain and has an ultrasound scheduled        Adult Physical Assessment  LOC: 49  y.o. female verified via two identifiers.  The patient is awake, alert & oriented to person, place & time. No acute distress noted at this time, pt is speaking appropriately at this time.  APPEARANCE: Patient resting comfortably and appears to be in no acute distress at this time. Patient is clean and well groomed, patient's clothing is properly fastened.  SKIN:The skin is warm, dry & intact. Color consistent with ethnicity, patient has normal skin turgor and moist mucus membranes, skin intact, no breakdown or brusing noted.  MUSCULOSKELETAL: Patient moving all extremities well, no obvious swelling or deformities noted.  RESPIRATORY: Airway is open and patent, respirations are spontaneous, even, and non-labored patient has a normal effort and rate, no accessory muscle use noted.     CARDIAC: Patient has a normal rate and rhythm, no periphreal edema noted in any extremity, capillary refill < 3 seconds in all extremities  ABDOMEN: Soft and non tender to palpation, no abdominal distention noted.   NEUROLOGIC: Sensation is intact. Eyes open spontaneously, behavior appropriate to situation, follows commands. Speech is clear and appropriate. Facial expression symmetrical, bilateral hand grasp equal and even. No bilateral lower extremity edema.

## 2024-08-17 NOTE — ED PROVIDER NOTES
Encounter Date: 2024       History     Chief Complaint   Patient presents with    Flank Pain     Pt states R sided upper flank pain x1 year, started having nausea and acid reflux this am.  Pt denies dysuria, denies fever/chills.  Pt denies chest pain, SOB.     49-year-old female with past medical history of ADHD presents to the ED with right flank pain x 2 years. Patient notes localized, intermittent pain. Of note, was seen by his PCP with the same complaint recently. Symptoms reported are not non-pleuritic, not worsened with movements and exacerbated with prolonged standing. Associated symptoms include with nausea but no emesis.  Patient denies chest pain, shortness of breath with the episodes.  Patient denies recent trauma or injuries.  Patient denies urinary complaints. States PCP has ordered an US of abdomen scheduled for Monday. Has been trying ibuprofen and tylenol with no relief of her symptoms.    The history is provided by the patient.     Review of patient's allergies indicates:  No Known Allergies  Past Medical History:   Diagnosis Date    ADHD     Cyst of Bartholin's gland     Inflammation of joint of right knee     Scar tissue     vaginal    Unspecified chronic bronchitis      Past Surgical History:   Procedure Laterality Date    bartholin gland removal      BIOPSY OF VAGINA Right 2022    Procedure: BIOPSY, VAGINA;  Surgeon: Feliciano Montes MD;  Location: [facility] OR;  Service: OB/GYN;  Laterality: Right;     SECTION, CLASSIC      HYSTERECTOMY       Family History   Problem Relation Name Age of Onset    Breast cancer Mother       Social History     Tobacco Use    Smoking status: Never    Smokeless tobacco: Never   Substance Use Topics    Alcohol use: Yes     Comment: social occassional    Drug use: No     Review of Systems   Constitutional:  Negative for chills and fever.   Respiratory:  Negative for chest tightness and shortness of breath.    Cardiovascular:  Negative for chest pain.    Gastrointestinal:  Negative for abdominal distention, abdominal pain, nausea and vomiting.   Genitourinary:  Positive for flank pain. Negative for dysuria and frequency.   Musculoskeletal:  Negative for neck pain and neck stiffness.       Physical Exam     Initial Vitals [08/17/24 1601]   BP Pulse Resp Temp SpO2   132/74 74 18 98 °F (36.7 °C) 100 %      MAP       --         Physical Exam    Vitals reviewed.  Constitutional: She appears well-developed and well-nourished. She is not diaphoretic. No distress.   HENT:   Head: Normocephalic and atraumatic.   Right Ear: External ear normal.   Left Ear: External ear normal.   Mouth/Throat: Oropharynx is clear and moist.   Eyes: EOM are normal.   Neck: Neck supple.   Cardiovascular:  Normal rate and normal heart sounds.           Pulmonary/Chest: Breath sounds normal. No respiratory distress. She has no wheezes.   Abdominal: Abdomen is soft. Bowel sounds are normal. She exhibits no distension. There is no abdominal tenderness.   No focal abdominal pain. Negative Hernández's  There is no rebound and no guarding.   Musculoskeletal:      Cervical back: Neck supple.      Comments: No CVA tenderness.    There is tenderness with palpation over the right lateral aspect of the flank region. No overlying skin changes, rashes, or wounds noted.      Neurological: She is alert and oriented to person, place, and time. GCS score is 15. GCS eye subscore is 4. GCS verbal subscore is 5. GCS motor subscore is 6.   Skin: Skin is warm. Capillary refill takes less than 2 seconds.   Psychiatric: She has a normal mood and affect. Her behavior is normal. Judgment and thought content normal.         ED Course   Procedures  Labs Reviewed   URINALYSIS, REFLEX TO URINE CULTURE - Abnormal       Result Value    Specimen UA Urine, Clean Catch      Color, UA Yellow      Appearance, UA Hazy (*)     pH, UA 7.0      Specific Gravity, UA 1.030      Protein, UA Trace (*)     Glucose, UA Negative      Ketones,  UA Negative      Bilirubin (UA) Negative      Occult Blood UA 1+ (*)     Nitrite, UA Negative      Urobilinogen, UA Negative      Leukocytes, UA 1+ (*)     Narrative:     Specimen Source->Urine   CBC W/ AUTO DIFFERENTIAL    WBC 6.98      RBC 4.25      Hemoglobin 12.5      Hematocrit 38.8      MCV 91      MCH 29.4      MCHC 32.2      RDW 13.7      Platelets 327      MPV 10.2      Immature Granulocytes 0.3      Gran # (ANC) 3.5      Immature Grans (Abs) 0.02      Lymph # 2.7      Mono # 0.6      Eos # 0.2      Baso # 0.01      nRBC 0      Gran % 50.5      Lymph % 38.8      Mono % 8.0      Eosinophil % 2.3      Basophil % 0.1      Differential Method Automated     URINALYSIS MICROSCOPIC    RBC, UA 4      WBC, UA 5      Bacteria Rare      Yeast, UA None      Squam Epithel, UA 14      Microscopic Comment SEE COMMENT      Narrative:     Specimen Source->Urine   COMPREHENSIVE METABOLIC PANEL    Sodium 138      Potassium 3.5      Chloride 104      CO2 26      Glucose 103      BUN 12      Creatinine 0.8      Calcium 9.2      Total Protein 7.8      Albumin 3.7      Total Bilirubin 0.2      Alkaline Phosphatase 78      AST 11      ALT 11      eGFR >60      Anion Gap 8     LIPASE    Lipase 59            Imaging Results              US Abdomen Complete (Final result)  Result time 08/17/24 19:10:52      Final result by Ventura Cohen MD (08/17/24 19:10:52)                   Impression:      Echogenic hepatic echotexture, possibly reflecting steatosis, correlation with LFTs recommended.    No findings to suggest acute cholecystitis.      Electronically signed by: Ventura Cohen MD  Date:    08/17/2024  Time:    19:10               Narrative:    EXAMINATION:  US ABDOMEN COMPLETE    CLINICAL HISTORY:  Unspecified abdominal pain    TECHNIQUE:  Complete abdominal ultrasound (including pancreas, aorta, liver, gallbladder, common bile duct, IVC, kidneys, and spleen) was performed.    COMPARISON:  CT renal stone study  05/09/2021    FINDINGS:  The visualized portions of the pancreas are unremarkable.  The aorta and IVC are unremarkable.  The gallbladder is nondistended.  No gallbladder wall thickening or gallbladder wall hyperemia.  Sonographic Hernández sign is negative.  No pericholecystic fluid.  The common duct is not dilated measuring 0.4 cm.  The liver is prominent measuring up to 18 cm.  Hepatic echotexture is somewhat echogenic, may reflect underlying steatosis.  The kidneys are unremarkable, no hydronephrosis.  The spleen is unremarkable.  No ascites.                                       Medications   sodium chloride 0.9% bolus 1,000 mL 1,000 mL (0 mLs Intravenous Stopped 8/17/24 1805)   ketorolac injection 9.999 mg (9.999 mg Intravenous Given 8/17/24 1648)   ondansetron injection 4 mg (4 mg Intravenous Given 8/17/24 1648)     Medical Decision Making  Differential Diagnosis includes, but is not limited to:  AAA, aortic dissection, SBO/volvulus, intussusception, ileus, appendicitis, cholecystitis, hepatitis, nephrolithiasis, pancreatitis, IBD/IBS, biliary colic, GERD, PUD, constipation, UTI/pyelonephritis, musculoskeletal pain.         ED management     49-year-old female with past medical history of ADHD presents to the ED with right flank pain x 1 year. Patient is not toxic appearing, hemodynamically stable and resting comfortably on bed. Patient is well-appearing.  Awake and alert.  Afebrile with vitals WNL. No distress on exam. Pt with chronic right sided flank pain. Patient presenting with flank  pain. Urinalysis does not appear to be infected.  US complete imaging results discussed on ED course.  Was given a dose of Toradol and IV fluids started to notice some relief.  The patient has no increased creatinine, no renal insufficieny and no sign of infection. Pain is controlled while here in the emergency department. No systemic symptoms. Non- septic. Based on today's history, physical and objective data obtained on ED  workup, no acute emergent medical condition has been identified. Symptoms are not consistent with cardiac etiology at this time. The patient is comfortable with this plan and comfortable going home at this time. Pt is stable at discharge.      I have discussed the specifics of the workup with the patient and the patient has verbalized understanding of the details of the workup, the diagnosis, the treatment plan, and the need for outpatient follow-up with PCP. ED precautions given. Discussed with pt about returning to the ED, if symptoms fail to improve or worsen.     RESULTS:  Documented in ED course.   Labs/ekg interpreted by myself        Voice recognition software utilized in this note. Typographical and content errors may occur with this process. While efforts are made to detect and correct such errors, in some cases errors will persist. For this reason, wording in this document should be considered in the proper context and not strictly verbatim.       Amount and/or Complexity of Data Reviewed  Labs: ordered. Decision-making details documented in ED Course.  Radiology: ordered. Decision-making details documented in ED Course.    Risk  Prescription drug management.               ED Course as of 08/18/24 1342   Sat Aug 17, 2024   1855 Lipase: 59  Within normal limits [NW]   1856 Comprehensive metabolic panel  Grossly unremarkable [NW]   1856 Urinalysis, Reflex to Urine Culture Urine, Clean Catch(!)  1+ leukocytes, occult blood.  No nitrites.  No evidence of UTI. [NW]   1856 BP: 132/74 [NW]   1856 Temp: 98 °F (36.7 °C) [NW]   1856 Pulse: 74 [NW]   1856 Resp: 18 [NW]   1914 US Abdomen Complete  Agree with radiologist's US interpretation.  FINDINGS:  The visualized portions of the pancreas are unremarkable.  The aorta and IVC are unremarkable.  The gallbladder is nondistended.  No gallbladder wall thickening or gallbladder wall hyperemia.  Sonographic Hernández sign is negative.  No pericholecystic fluid.  The common  duct is not dilated measuring 0.4 cm.  The liver is prominent measuring up to 18 cm.  Hepatic echotexture is somewhat echogenic, may reflect underlying steatosis.  The kidneys are unremarkable, no hydronephrosis.  The spleen is unremarkable.  No ascites.     Impression:     Echogenic hepatic echotexture, possibly reflecting steatosis, correlation with LFTs recommended.     No findings to suggest acute cholecystitis.   [NW]   1922 FINDINGS:  Rib two views right.     No fracture, dislocation, or bone destruction seen.  No pneumothorax, pleural fluid, or lung contusion seen.     Impression:     No acute process seen.        Electronically signed by:Nasim Florentino MD  Date:                                            02/28/2024   [NW]      ED Course User Index  [NW] Brittany Brady PA-C                           Clinical Impression:  Final diagnoses:  [R10.9] Right flank pain (Primary)          ED Disposition Condition    Discharge           ED Prescriptions       Medication Sig Dispense Start Date End Date Auth. Provider    LIDOcaine (LIDODERM) 5 % Place 1 patch onto the skin once daily. Remove & Discard patch within 12 hours or as directed by MD 6 patch 8/17/2024 -- Brittany Brady PA-C    naproxen (NAPROSYN) 500 MG tablet Take 1 tablet (500 mg total) by mouth 2 (two) times daily. 60 tablet 8/17/2024 -- Brittany Brady PA-C          Follow-up Information       Follow up With Specialties Details Why Contact Info    Jamie Caban MD Family Medicine Schedule an appointment as soon as possible for a visit in 3 days If symptoms worsen, As needed [address]  [address]  [place] [address] [phone]               Brittany Brady PA-C  08/18/24 8955

## (undated) DEVICE — SCRUB 10% POVIDONE IODINE 4OZ

## (undated) DEVICE — TUBING SUCTION STR .25INX6FT

## (undated) DEVICE — SEE L#120831

## (undated) DEVICE — SET CYSTO IRRIGATION UNIV SPIK

## (undated) DEVICE — SPONGE GAUZE 16PLY 4X4

## (undated) DEVICE — GOWN POLY REINF BRTH SLV LG

## (undated) DEVICE — GLOVE PROTEXIS PI CLASSIC 7.5

## (undated) DEVICE — ELECTRODE REM PLYHSV RETURN 9

## (undated) DEVICE — TRAY SKIN SCRUB DRY PREMIUM

## (undated) DEVICE — GLOVE SURG ULTRA TOUCH 6

## (undated) DEVICE — DRAPE UINDERBUT GRAD PCH

## (undated) DEVICE — PACK DRAPE PERI/GYN TIBURON

## (undated) DEVICE — SYR 10CC LUER LOCK

## (undated) DEVICE — SOL IRR WATER STRL 3000 ML

## (undated) DEVICE — ELECTRODE PENCIL W/ROCKER NDL

## (undated) DEVICE — KIT RETR PERI/GYN W/O STAYS

## (undated) DEVICE — SYS LABEL CORRECT MED

## (undated) DEVICE — HOOK STAY ELAS 5MM 8EA/PK